# Patient Record
Sex: FEMALE | Race: WHITE | NOT HISPANIC OR LATINO | Employment: FULL TIME | URBAN - METROPOLITAN AREA
[De-identification: names, ages, dates, MRNs, and addresses within clinical notes are randomized per-mention and may not be internally consistent; named-entity substitution may affect disease eponyms.]

---

## 2024-01-26 ENCOUNTER — LAB (OUTPATIENT)
Dept: LAB | Facility: HOSPITAL | Age: 29
End: 2024-01-26
Payer: COMMERCIAL

## 2024-01-26 ENCOUNTER — TRANSCRIBE ORDERS (OUTPATIENT)
Dept: LAB | Facility: HOSPITAL | Age: 29
End: 2024-01-26
Payer: COMMERCIAL

## 2024-01-26 DIAGNOSIS — Z34.81 ENCOUNTER FOR SUPERVISION OF OTHER NORMAL PREGNANCY, FIRST TRIMESTER: Primary | ICD-10-CM

## 2024-01-26 DIAGNOSIS — Z3A.08 8 WEEKS GESTATION OF PREGNANCY: ICD-10-CM

## 2024-01-26 DIAGNOSIS — Z34.81 ENCOUNTER FOR SUPERVISION OF OTHER NORMAL PREGNANCY, FIRST TRIMESTER: ICD-10-CM

## 2024-01-26 LAB
ABO GROUP BLD: NORMAL
AMPHET+METHAMPHET UR QL: NEGATIVE
AMPHETAMINES UR QL: NEGATIVE
BARBITURATES UR QL SCN: NEGATIVE
BENZODIAZ UR QL SCN: NEGATIVE
BLD GP AB SCN SERPL QL: NEGATIVE
BUPRENORPHINE SERPL-MCNC: NEGATIVE NG/ML
CANNABINOIDS SERPL QL: NEGATIVE
COCAINE UR QL: NEGATIVE
DEPRECATED RDW RBC AUTO: 38.5 FL (ref 37–54)
ERYTHROCYTE [DISTWIDTH] IN BLOOD BY AUTOMATED COUNT: 12 % (ref 12.3–15.4)
FENTANYL UR-MCNC: NEGATIVE NG/ML
HBV SURFACE AG SERPL QL IA: NORMAL
HCT VFR BLD AUTO: 37.1 % (ref 34–46.6)
HCV AB SER DONR QL: NORMAL
HGB BLD-MCNC: 12.7 G/DL (ref 12–15.9)
HIV 1+2 AB+HIV1 P24 AG SERPL QL IA: NORMAL
HOLD SPECIMEN: NORMAL
MCH RBC QN AUTO: 30.5 PG (ref 26.6–33)
MCHC RBC AUTO-ENTMCNC: 34.2 G/DL (ref 31.5–35.7)
MCV RBC AUTO: 89 FL (ref 79–97)
METHADONE UR QL SCN: NEGATIVE
OPIATES UR QL: NEGATIVE
OXYCODONE UR QL SCN: NEGATIVE
PCP UR QL SCN: NEGATIVE
PLATELET # BLD AUTO: 223 10*3/MM3 (ref 140–450)
PMV BLD AUTO: 10.4 FL (ref 6–12)
RBC # BLD AUTO: 4.17 10*6/MM3 (ref 3.77–5.28)
RH BLD: POSITIVE
TRICYCLICS UR QL SCN: NEGATIVE
WBC NRBC COR # BLD AUTO: 9.21 10*3/MM3 (ref 3.4–10.8)

## 2024-01-26 PROCEDURE — 86803 HEPATITIS C AB TEST: CPT

## 2024-01-26 PROCEDURE — 86901 BLOOD TYPING SEROLOGIC RH(D): CPT

## 2024-01-26 PROCEDURE — 80307 DRUG TEST PRSMV CHEM ANLYZR: CPT

## 2024-01-26 PROCEDURE — 87491 CHLMYD TRACH DNA AMP PROBE: CPT

## 2024-01-26 PROCEDURE — 87591 N.GONORRHOEAE DNA AMP PROB: CPT

## 2024-01-26 PROCEDURE — 85027 COMPLETE CBC AUTOMATED: CPT

## 2024-01-26 PROCEDURE — 86762 RUBELLA ANTIBODY: CPT

## 2024-01-26 PROCEDURE — 87340 HEPATITIS B SURFACE AG IA: CPT

## 2024-01-26 PROCEDURE — 81001 URINALYSIS AUTO W/SCOPE: CPT

## 2024-01-26 PROCEDURE — 87086 URINE CULTURE/COLONY COUNT: CPT

## 2024-01-26 PROCEDURE — 86900 BLOOD TYPING SEROLOGIC ABO: CPT

## 2024-01-26 PROCEDURE — 86780 TREPONEMA PALLIDUM: CPT

## 2024-01-26 PROCEDURE — G0432 EIA HIV-1/HIV-2 SCREEN: HCPCS

## 2024-01-26 PROCEDURE — 86850 RBC ANTIBODY SCREEN: CPT

## 2024-01-26 PROCEDURE — 36415 COLL VENOUS BLD VENIPUNCTURE: CPT

## 2024-01-27 LAB
BACTERIA SPEC AEROBE CULT: NORMAL
BACTERIA UR QL AUTO: ABNORMAL /HPF
BILIRUB UR QL STRIP: NEGATIVE
CLARITY UR: CLEAR
COLOR UR: YELLOW
GLUCOSE UR STRIP-MCNC: NEGATIVE MG/DL
HGB UR QL STRIP.AUTO: NEGATIVE
HYALINE CASTS UR QL AUTO: ABNORMAL /LPF
KETONES UR QL STRIP: NEGATIVE
LEUKOCYTE ESTERASE UR QL STRIP.AUTO: ABNORMAL
NITRITE UR QL STRIP: NEGATIVE
PH UR STRIP.AUTO: 6.5 [PH] (ref 5–8)
PROT UR QL STRIP: NEGATIVE
RBC # UR STRIP: ABNORMAL /HPF
REF LAB TEST METHOD: ABNORMAL
SP GR UR STRIP: 1.01 (ref 1–1.03)
SQUAMOUS #/AREA URNS HPF: ABNORMAL /HPF
UROBILINOGEN UR QL STRIP: ABNORMAL
WBC # UR STRIP: ABNORMAL /HPF

## 2024-01-28 LAB — RUBV IGG SERPL IA-ACNC: 6.49 INDEX

## 2024-01-30 LAB
C TRACH RRNA SPEC QL NAA+PROBE: NEGATIVE
N GONORRHOEA RRNA SPEC QL NAA+PROBE: NEGATIVE
TREPONEMA PALLIDUM IGG+IGM AB [PRESENCE] IN SERUM OR PLASMA BY IMMUNOASSAY: NON REACTIVE

## 2024-05-21 ENCOUNTER — TRANSCRIBE ORDERS (OUTPATIENT)
Facility: HOSPITAL | Age: 29
End: 2024-05-21
Payer: COMMERCIAL

## 2024-05-21 ENCOUNTER — LAB (OUTPATIENT)
Facility: HOSPITAL | Age: 29
End: 2024-05-21
Payer: COMMERCIAL

## 2024-05-21 DIAGNOSIS — Z3A.24 24 WEEKS GESTATION OF PREGNANCY: Primary | ICD-10-CM

## 2024-05-21 DIAGNOSIS — Z3A.24 24 WEEKS GESTATION OF PREGNANCY: ICD-10-CM

## 2024-05-21 LAB
DEPRECATED RDW RBC AUTO: 42.1 FL (ref 37–54)
ERYTHROCYTE [DISTWIDTH] IN BLOOD BY AUTOMATED COUNT: 12.2 % (ref 12.3–15.4)
GLUCOSE 1H P 100 G GLC PO SERPL-MCNC: 105 MG/DL (ref 65–139)
HCT VFR BLD AUTO: 32 % (ref 34–46.6)
HGB BLD-MCNC: 10.7 G/DL (ref 12–15.9)
MCH RBC QN AUTO: 31.8 PG (ref 26.6–33)
MCHC RBC AUTO-ENTMCNC: 33.4 G/DL (ref 31.5–35.7)
MCV RBC AUTO: 95 FL (ref 79–97)
PLATELET # BLD AUTO: 166 10*3/MM3 (ref 140–450)
PMV BLD AUTO: 10.1 FL (ref 6–12)
RBC # BLD AUTO: 3.37 10*6/MM3 (ref 3.77–5.28)
WBC NRBC COR # BLD AUTO: 9.35 10*3/MM3 (ref 3.4–10.8)

## 2024-05-21 PROCEDURE — 82950 GLUCOSE TEST: CPT

## 2024-05-21 PROCEDURE — 86780 TREPONEMA PALLIDUM: CPT

## 2024-05-21 PROCEDURE — 85027 COMPLETE CBC AUTOMATED: CPT

## 2024-05-21 PROCEDURE — 36415 COLL VENOUS BLD VENIPUNCTURE: CPT

## 2024-05-24 LAB — TREPONEMA PALLIDUM IGG+IGM AB [PRESENCE] IN SERUM OR PLASMA BY IMMUNOASSAY: NON REACTIVE

## 2024-07-11 ENCOUNTER — HOSPITAL ENCOUNTER (INPATIENT)
Facility: HOSPITAL | Age: 29
LOS: 4 days | Discharge: HOME | End: 2024-07-15
Attending: OBSTETRICS & GYNECOLOGY | Admitting: OBSTETRICS & GYNECOLOGY
Payer: COMMERCIAL

## 2024-07-11 ENCOUNTER — PREP FOR SURGERY (OUTPATIENT)
Dept: OTHER | Facility: HOSPITAL | Age: 29
End: 2024-07-11
Payer: COMMERCIAL

## 2024-07-11 DIAGNOSIS — O13.3 PREGNANCY-INDUCED HYPERTENSION, THIRD TRIMESTER: Primary | ICD-10-CM

## 2024-07-11 DIAGNOSIS — O13.3 PREGNANCY-INDUCED HYPERTENSION, THIRD TRIMESTER: ICD-10-CM

## 2024-07-11 PROBLEM — O16.9 HYPERTENSION IN PREGNANCY, ANTEPARTUM: Status: ACTIVE | Noted: 2024-07-11

## 2024-07-11 LAB
ABO GROUP BLD: NORMAL
ALBUMIN SERPL-MCNC: 4 G/DL (ref 3.5–5.2)
ALBUMIN/GLOB SERPL: 1.3 G/DL
ALP SERPL-CCNC: 170 U/L (ref 39–117)
ALT SERPL W P-5'-P-CCNC: 13 U/L (ref 1–33)
ANION GAP SERPL CALCULATED.3IONS-SCNC: 15 MMOL/L (ref 5–15)
AST SERPL-CCNC: 25 U/L (ref 1–32)
BILIRUB SERPL-MCNC: 0.5 MG/DL (ref 0–1.2)
BLD GP AB SCN SERPL QL: NEGATIVE
BUN SERPL-MCNC: 4 MG/DL (ref 6–20)
BUN/CREAT SERPL: 7.4 (ref 7–25)
CALCIUM SPEC-SCNC: 9.5 MG/DL (ref 8.6–10.5)
CHLORIDE SERPL-SCNC: 103 MMOL/L (ref 98–107)
CO2 SERPL-SCNC: 19 MMOL/L (ref 22–29)
CREAT SERPL-MCNC: 0.54 MG/DL (ref 0.57–1)
DEPRECATED RDW RBC AUTO: 40.5 FL (ref 37–54)
EGFRCR SERPLBLD CKD-EPI 2021: 129.6 ML/MIN/1.73
ERYTHROCYTE [DISTWIDTH] IN BLOOD BY AUTOMATED COUNT: 12.4 % (ref 12.3–15.4)
GLOBULIN UR ELPH-MCNC: 3 GM/DL
GLUCOSE SERPL-MCNC: 89 MG/DL (ref 65–99)
HCT VFR BLD AUTO: 36.1 % (ref 34–46.6)
HGB BLD-MCNC: 12.6 G/DL (ref 12–15.9)
LDH SERPL-CCNC: 222 U/L (ref 135–214)
MCH RBC QN AUTO: 31.3 PG (ref 26.6–33)
MCHC RBC AUTO-ENTMCNC: 34.9 G/DL (ref 31.5–35.7)
MCV RBC AUTO: 89.6 FL (ref 79–97)
PLATELET # BLD AUTO: 173 10*3/MM3 (ref 140–450)
PMV BLD AUTO: 10.7 FL (ref 6–12)
POTASSIUM SERPL-SCNC: 3.5 MMOL/L (ref 3.5–5.2)
PROT SERPL-MCNC: 7 G/DL (ref 6–8.5)
RBC # BLD AUTO: 4.03 10*6/MM3 (ref 3.77–5.28)
RH BLD: POSITIVE
SODIUM SERPL-SCNC: 137 MMOL/L (ref 136–145)
T&S EXPIRATION DATE: NORMAL
TREPONEMA PALLIDUM IGG+IGM AB [PRESENCE] IN SERUM OR PLASMA BY IMMUNOASSAY: NORMAL
URATE SERPL-MCNC: 4.7 MG/DL (ref 2.4–5.7)
WBC NRBC COR # BLD AUTO: 13.02 10*3/MM3 (ref 3.4–10.8)

## 2024-07-11 PROCEDURE — 84156 ASSAY OF PROTEIN URINE: CPT | Performed by: OBSTETRICS & GYNECOLOGY

## 2024-07-11 PROCEDURE — 84550 ASSAY OF BLOOD/URIC ACID: CPT | Performed by: OBSTETRICS & GYNECOLOGY

## 2024-07-11 PROCEDURE — 86850 RBC ANTIBODY SCREEN: CPT | Performed by: OBSTETRICS & GYNECOLOGY

## 2024-07-11 PROCEDURE — 83615 LACTATE (LD) (LDH) ENZYME: CPT | Performed by: OBSTETRICS & GYNECOLOGY

## 2024-07-11 PROCEDURE — 86780 TREPONEMA PALLIDUM: CPT | Performed by: OBSTETRICS & GYNECOLOGY

## 2024-07-11 PROCEDURE — 25010000002 PENICILLIN G POTASSIUM PER 600000 UNITS: Performed by: OBSTETRICS & GYNECOLOGY

## 2024-07-11 PROCEDURE — 85027 COMPLETE CBC AUTOMATED: CPT | Performed by: OBSTETRICS & GYNECOLOGY

## 2024-07-11 PROCEDURE — 86901 BLOOD TYPING SEROLOGIC RH(D): CPT | Performed by: OBSTETRICS & GYNECOLOGY

## 2024-07-11 PROCEDURE — 82570 ASSAY OF URINE CREATININE: CPT | Performed by: OBSTETRICS & GYNECOLOGY

## 2024-07-11 PROCEDURE — 80053 COMPREHEN METABOLIC PANEL: CPT | Performed by: OBSTETRICS & GYNECOLOGY

## 2024-07-11 PROCEDURE — 25010000002 BETAMETHASONE ACET & SOD PHOS PER 4 MG: Performed by: OBSTETRICS & GYNECOLOGY

## 2024-07-11 PROCEDURE — 86900 BLOOD TYPING SEROLOGIC ABO: CPT | Performed by: OBSTETRICS & GYNECOLOGY

## 2024-07-11 RX ORDER — ACETAMINOPHEN 325 MG/1
650 TABLET ORAL EVERY 4 HOURS PRN
Status: CANCELLED | OUTPATIENT
Start: 2024-07-11

## 2024-07-11 RX ORDER — PENICILLIN G 3000000 [IU]/50ML
3 INJECTION, SOLUTION INTRAVENOUS EVERY 4 HOURS
Status: CANCELLED | OUTPATIENT
Start: 2024-07-11 | End: 2024-07-13

## 2024-07-11 RX ORDER — SODIUM CHLORIDE 0.9 % (FLUSH) 0.9 %
10 SYRINGE (ML) INJECTION EVERY 12 HOURS SCHEDULED
Status: CANCELLED | OUTPATIENT
Start: 2024-07-11

## 2024-07-11 RX ORDER — HYDRALAZINE HYDROCHLORIDE 20 MG/ML
5-10 INJECTION INTRAMUSCULAR; INTRAVENOUS
Status: DISCONTINUED | OUTPATIENT
Start: 2024-07-11 | End: 2024-07-15 | Stop reason: HOSPADM

## 2024-07-11 RX ORDER — SODIUM CHLORIDE 0.9 % (FLUSH) 0.9 %
10 SYRINGE (ML) INJECTION AS NEEDED
Status: CANCELLED | OUTPATIENT
Start: 2024-07-11

## 2024-07-11 RX ORDER — BETAMETHASONE SODIUM PHOSPHATE AND BETAMETHASONE ACETATE 3; 3 MG/ML; MG/ML
12 INJECTION, SUSPENSION INTRA-ARTICULAR; INTRALESIONAL; INTRAMUSCULAR; SOFT TISSUE ONCE
Qty: 2 ML | Refills: 0 | Status: COMPLETED | OUTPATIENT
Start: 2024-07-12 | End: 2024-07-12

## 2024-07-11 RX ORDER — NIFEDIPINE 10 MG/1
10 CAPSULE ORAL ONCE
Status: COMPLETED | OUTPATIENT
Start: 2024-07-11 | End: 2024-07-11

## 2024-07-11 RX ORDER — ACETAMINOPHEN 325 MG/1
650 TABLET ORAL EVERY 4 HOURS PRN
Status: DISCONTINUED | OUTPATIENT
Start: 2024-07-11 | End: 2024-07-15 | Stop reason: HOSPADM

## 2024-07-11 RX ORDER — SODIUM CHLORIDE 9 MG/ML
40 INJECTION, SOLUTION INTRAVENOUS AS NEEDED
Status: DISCONTINUED | OUTPATIENT
Start: 2024-07-11 | End: 2024-07-15 | Stop reason: HOSPADM

## 2024-07-11 RX ORDER — LIDOCAINE HYDROCHLORIDE 10 MG/ML
0.5 INJECTION, SOLUTION EPIDURAL; INFILTRATION; INTRACAUDAL; PERINEURAL ONCE AS NEEDED
Status: DISCONTINUED | OUTPATIENT
Start: 2024-07-11 | End: 2024-07-15 | Stop reason: HOSPADM

## 2024-07-11 RX ORDER — OXYTOCIN/0.9 % SODIUM CHLORIDE 30/500 ML
2-30 PLASTIC BAG, INJECTION (ML) INTRAVENOUS
Status: DISCONTINUED | OUTPATIENT
Start: 2024-07-12 | End: 2024-07-13

## 2024-07-11 RX ORDER — ONDANSETRON 4 MG/1
8 TABLET, ORALLY DISINTEGRATING ORAL EVERY 8 HOURS PRN
Status: CANCELLED | OUTPATIENT
Start: 2024-07-11

## 2024-07-11 RX ORDER — ONDANSETRON 2 MG/ML
4 INJECTION INTRAMUSCULAR; INTRAVENOUS EVERY 8 HOURS PRN
Status: DISCONTINUED | OUTPATIENT
Start: 2024-07-11 | End: 2024-07-15 | Stop reason: HOSPADM

## 2024-07-11 RX ORDER — ONDANSETRON 2 MG/ML
4 INJECTION INTRAMUSCULAR; INTRAVENOUS EVERY 8 HOURS PRN
Status: CANCELLED | OUTPATIENT
Start: 2024-07-11

## 2024-07-11 RX ORDER — SODIUM CHLORIDE 0.9 % (FLUSH) 0.9 %
10 SYRINGE (ML) INJECTION AS NEEDED
Status: DISCONTINUED | OUTPATIENT
Start: 2024-07-11 | End: 2024-07-15 | Stop reason: HOSPADM

## 2024-07-11 RX ORDER — LABETALOL HYDROCHLORIDE 5 MG/ML
20-80 INJECTION, SOLUTION INTRAVENOUS
Status: DISCONTINUED | OUTPATIENT
Start: 2024-07-11 | End: 2024-07-15 | Stop reason: HOSPADM

## 2024-07-11 RX ORDER — DEXTROSE AND SODIUM CHLORIDE 5; .2 G/100ML; G/100ML
125 INJECTION, SOLUTION INTRAVENOUS CONTINUOUS
Status: CANCELLED | OUTPATIENT
Start: 2024-07-11

## 2024-07-11 RX ORDER — PRENATAL WITH FERROUS FUM AND FOLIC ACID 3080; 920; 120; 400; 22; 1.84; 3; 20; 10; 1; 12; 200; 27; 25; 2 [IU]/1; [IU]/1; MG/1; [IU]/1; MG/1; MG/1; MG/1; MG/1; MG/1; MG/1; UG/1; MG/1; MG/1; MG/1; MG/1
TABLET ORAL DAILY
COMMUNITY

## 2024-07-11 RX ORDER — NIFEDIPINE 10 MG/1
10-20 CAPSULE ORAL
Status: DISCONTINUED | OUTPATIENT
Start: 2024-07-11 | End: 2024-07-15 | Stop reason: HOSPADM

## 2024-07-11 RX ORDER — LIDOCAINE HYDROCHLORIDE 10 MG/ML
0.5 INJECTION, SOLUTION EPIDURAL; INFILTRATION; INTRACAUDAL; PERINEURAL ONCE AS NEEDED
Status: CANCELLED | OUTPATIENT
Start: 2024-07-11

## 2024-07-11 RX ORDER — SODIUM CHLORIDE 9 MG/ML
40 INJECTION, SOLUTION INTRAVENOUS AS NEEDED
Status: CANCELLED | OUTPATIENT
Start: 2024-07-11

## 2024-07-11 RX ORDER — FERROUS SULFATE 324(65)MG
324 TABLET, DELAYED RELEASE (ENTERIC COATED) ORAL
COMMUNITY

## 2024-07-11 RX ORDER — SODIUM CHLORIDE 0.9 % (FLUSH) 0.9 %
10 SYRINGE (ML) INJECTION EVERY 12 HOURS SCHEDULED
Status: DISCONTINUED | OUTPATIENT
Start: 2024-07-11 | End: 2024-07-15 | Stop reason: HOSPADM

## 2024-07-11 RX ORDER — BETAMETHASONE SODIUM PHOSPHATE AND BETAMETHASONE ACETATE 3; 3 MG/ML; MG/ML
12 INJECTION, SUSPENSION INTRA-ARTICULAR; INTRALESIONAL; INTRAMUSCULAR; SOFT TISSUE EVERY 24 HOURS
Status: CANCELLED | OUTPATIENT
Start: 2024-07-11 | End: 2024-07-13

## 2024-07-11 RX ORDER — ONDANSETRON 4 MG/1
8 TABLET, ORALLY DISINTEGRATING ORAL EVERY 8 HOURS PRN
Status: DISCONTINUED | OUTPATIENT
Start: 2024-07-11 | End: 2024-07-15 | Stop reason: HOSPADM

## 2024-07-11 RX ORDER — DEXTROSE AND SODIUM CHLORIDE 5; .2 G/100ML; G/100ML
125 INJECTION, SOLUTION INTRAVENOUS CONTINUOUS
Status: DISCONTINUED | OUTPATIENT
Start: 2024-07-11 | End: 2024-07-13

## 2024-07-11 RX ORDER — BETAMETHASONE SODIUM PHOSPHATE AND BETAMETHASONE ACETATE 3; 3 MG/ML; MG/ML
12 INJECTION, SUSPENSION INTRA-ARTICULAR; INTRALESIONAL; INTRAMUSCULAR; SOFT TISSUE EVERY 24 HOURS
Qty: 4 ML | Refills: 0 | Status: DISCONTINUED | OUTPATIENT
Start: 2024-07-11 | End: 2024-07-11

## 2024-07-11 RX ORDER — PENICILLIN G 3000000 [IU]/50ML
3 INJECTION, SOLUTION INTRAVENOUS EVERY 4 HOURS
Status: DISPENSED | OUTPATIENT
Start: 2024-07-11 | End: 2024-07-13

## 2024-07-11 RX ORDER — MAGNESIUM SULFATE HEPTAHYDRATE 40 MG/ML
2 INJECTION, SOLUTION INTRAVENOUS CONTINUOUS
Status: DISCONTINUED | OUTPATIENT
Start: 2024-07-11 | End: 2024-07-13 | Stop reason: SDUPTHER

## 2024-07-11 RX ORDER — MAGNESIUM SULFATE HEPTAHYDRATE 40 MG/ML
2 INJECTION, SOLUTION INTRAVENOUS CONTINUOUS
Status: CANCELLED | OUTPATIENT
Start: 2024-07-11 | End: 2024-07-14

## 2024-07-11 RX ADMIN — PENICILLIN G 3 MILLION UNITS: 3000000 INJECTION, SOLUTION INTRAVENOUS at 22:29

## 2024-07-11 RX ADMIN — SODIUM CHLORIDE 5 MILLION UNITS: 900 INJECTION INTRAVENOUS at 19:25

## 2024-07-11 RX ADMIN — DEXTROSE AND SODIUM CHLORIDE 125 ML/HR: 5; 200 INJECTION, SOLUTION INTRAVENOUS at 18:29

## 2024-07-11 RX ADMIN — BETAMETHASONE ACETATE AND BETAMETHASONE SODIUM PHOSPHATE 12 MG: 3; 3 INJECTION, SUSPENSION INTRA-ARTICULAR; INTRALESIONAL; INTRAMUSCULAR; SOFT TISSUE at 18:27

## 2024-07-11 RX ADMIN — NIFEDIPINE 10 MG: 10 CAPSULE ORAL at 18:16

## 2024-07-11 NOTE — H&P
[facility]  Obstetric History and Physical    Chief Complaint   Patient presents with    Elevated Blood Pressure       Subjective     Patient is a 29 y.o. female  currently at 33w6d, who presented to the office for routine prenatal visit.  Her blood pressure was noted to be severe range.  She reported a headache 3 days ago that resolved with Tylenol.  Her ears have felt very hot for the last 3 days.    Her prenatal care had previously been uncomplicated to date.  Her previous obstetric/gynecological history is non-contributory.    The following portions of the patients history were reviewed and updated as appropriate: current medications, allergies, past medical history, past surgical history, past family history, past social history, and problem list .                   Past OB History:       OB History    Para Term  AB Living   1 0 0 0 0 0   SAB IAB Ectopic Molar Multiple Live Births   0 0 0 0 0 0      # Outcome Date GA Lbr Juan/2nd Weight Sex Type Anes PTL Lv   1 Current                Past Medical History: No past medical history on file.   Past Surgical History Past Surgical History:   Procedure Laterality Date    WISDOM TOOTH EXTRACTION        Family History: No family history on file.   Social History:  reports that she has never smoked. She has never used smokeless tobacco.   reports no history of alcohol use.   reports no history of drug use.        REVIEW OF SYSTEMS              Reports fetal movement is normal             Denies leakage of amniotic fluid.             Denies vaginal bleeding             She reports No contractions             All other systems reviewed and are negative    Objective       Vital Signs Range for the last 24 hours  Temperature:     Temp Source:     BP: BP: (162-166)/(105-106) 166/106   Pulse:     Respirations:     SPO2:     O2 Amount (l/min):     O2 Devices     Weight: Weight:  [68.9 kg (152 lb)] 68.9 kg (152 lb)          Fetal Heart Rate Assessment    Method:     Beats/min:     Baseline:  135   Variability:  moderate   Accels:  15x15   Decels:  absent   Tracing Category:  1     Uterine Assessment   Method: Method: palpation, per patient report   Frequency (min):  q4min   Ctx Count in 10 min:     Duration:     Intensity: Contraction Intensity: no contractions   Intensity by IUPC:     Resting Tone: Uterine Resting Tone: soft by palpation   Resting Tone by IUPC:     [place] Units:       Constitutional:  Well developed, well nourished, no acute distress, well-groomed.   Uterus: Soft, nontender. Fundus appropriate for dates.  Neurologic:  Alert & oriented x 3,  no focal deficits noted.   Psychiatric:  Speech and behavior appropriate.   Extremities: no cyanosis, clubbing or edema, no evidence of DVT.        Labs:  Lab Results   Component Value Date    WBC 9.35 05/21/2024    HGB 10.7 (L) 05/21/2024    HCT 32.0 (L) 05/21/2024    MCV 95.0 05/21/2024     05/21/2024    CREATININE 0.73 04/09/2021    AST 23 04/09/2021    ALT 17 04/09/2021               Assessment & Plan       Hypertension in pregnancy, antepartum        Assessment:  1.  Intrauterine pregnancy at 33w6d weeks gestation with reactive fetal status.    2.   Severe hypertension  3.  Obstetrical history non-contributory  4.  GBS status: unknown    Plan:  CBC, PEP, urine P:C.  Nifedipine IR now for severe range blood pressure.  Betamethasone for fetal lung maturity.  Repeat in 12 hours.  Magnesium sulfate for seizure prophylaxis.  2. Plan of care has been reviewed with patient and questions answered.  3.  Risks, benefits of treatment plan have been discussed.  4.  All questions have been answered.        Nelly Wesley MD  7/11/2024  18:20 EDT

## 2024-07-12 ENCOUNTER — ANESTHESIA EVENT (OUTPATIENT)
Dept: LABOR AND DELIVERY | Facility: HOSPITAL | Age: 29
End: 2024-07-12
Payer: COMMERCIAL

## 2024-07-12 ENCOUNTER — ANESTHESIA (OUTPATIENT)
Dept: LABOR AND DELIVERY | Facility: HOSPITAL | Age: 29
End: 2024-07-12
Payer: COMMERCIAL

## 2024-07-12 LAB
CREAT UR-MCNC: 13.7 MG/DL
PROT ?TM UR-MCNC: 5.4 MG/DL
PROT/CREAT UR: 394.2 MG/G CREA (ref 0–200)

## 2024-07-12 PROCEDURE — 25010000002 ONDANSETRON PER 1 MG: Performed by: OBSTETRICS & GYNECOLOGY

## 2024-07-12 PROCEDURE — 25010000002 MAGNESIUM SULFATE 20 GM/500ML SOLUTION: Performed by: OBSTETRICS & GYNECOLOGY

## 2024-07-12 PROCEDURE — 25010000002 BETAMETHASONE ACET & SOD PHOS PER 4 MG: Performed by: OBSTETRICS & GYNECOLOGY

## 2024-07-12 PROCEDURE — 25810000003 LACTATED RINGERS SOLUTION: Performed by: ANESTHESIOLOGY

## 2024-07-12 PROCEDURE — 25010000002 FENTANYL CITRATE (PF) 50 MCG/ML SOLUTION: Performed by: ANESTHESIOLOGY

## 2024-07-12 PROCEDURE — 0 LABETALOL 5 MG/ML SOLUTION: Performed by: OBSTETRICS & GYNECOLOGY

## 2024-07-12 PROCEDURE — 25010000002 PENICILLIN G POTASSIUM PER 600000 UNITS: Performed by: OBSTETRICS & GYNECOLOGY

## 2024-07-12 PROCEDURE — 25010000002 BUPIVACAINE (PF) 0.25 % SOLUTION: Performed by: ANESTHESIOLOGY

## 2024-07-12 PROCEDURE — 25010000002 ROPIVACAINE PER 1 MG: Performed by: ANESTHESIOLOGY

## 2024-07-12 PROCEDURE — 25810000003 LACTATED RINGERS SOLUTION: Performed by: STUDENT IN AN ORGANIZED HEALTH CARE EDUCATION/TRAINING PROGRAM

## 2024-07-12 PROCEDURE — C1755 CATHETER, INTRASPINAL: HCPCS | Performed by: ANESTHESIOLOGY

## 2024-07-12 PROCEDURE — 59025 FETAL NON-STRESS TEST: CPT

## 2024-07-12 PROCEDURE — 59200 INSERT CERVICAL DILATOR: CPT | Performed by: OBSTETRICS & GYNECOLOGY

## 2024-07-12 RX ORDER — CITRIC ACID/SODIUM CITRATE 334-500MG
30 SOLUTION, ORAL ORAL ONCE
Status: DISCONTINUED | OUTPATIENT
Start: 2024-07-12 | End: 2024-07-15 | Stop reason: HOSPADM

## 2024-07-12 RX ORDER — DIPHENHYDRAMINE HYDROCHLORIDE 50 MG/ML
12.5 INJECTION INTRAMUSCULAR; INTRAVENOUS EVERY 8 HOURS PRN
Status: DISCONTINUED | OUTPATIENT
Start: 2024-07-12 | End: 2024-07-15 | Stop reason: HOSPADM

## 2024-07-12 RX ORDER — FENTANYL CITRATE 50 UG/ML
INJECTION, SOLUTION INTRAMUSCULAR; INTRAVENOUS AS NEEDED
Status: DISCONTINUED | OUTPATIENT
Start: 2024-07-12 | End: 2024-07-13 | Stop reason: SURG

## 2024-07-12 RX ORDER — SODIUM CHLORIDE, SODIUM LACTATE, POTASSIUM CHLORIDE, CALCIUM CHLORIDE 600; 310; 30; 20 MG/100ML; MG/100ML; MG/100ML; MG/100ML
100 INJECTION, SOLUTION INTRAVENOUS CONTINUOUS
Status: DISCONTINUED | OUTPATIENT
Start: 2024-07-13 | End: 2024-07-15 | Stop reason: HOSPADM

## 2024-07-12 RX ORDER — METOCLOPRAMIDE HYDROCHLORIDE 5 MG/ML
10 INJECTION INTRAMUSCULAR; INTRAVENOUS ONCE AS NEEDED
Status: DISCONTINUED | OUTPATIENT
Start: 2024-07-12 | End: 2024-07-15 | Stop reason: HOSPADM

## 2024-07-12 RX ORDER — FAMOTIDINE 10 MG/ML
20 INJECTION, SOLUTION INTRAVENOUS ONCE AS NEEDED
Status: DISCONTINUED | OUTPATIENT
Start: 2024-07-12 | End: 2024-07-15 | Stop reason: HOSPADM

## 2024-07-12 RX ORDER — ROPIVACAINE HYDROCHLORIDE 2 MG/ML
15 INJECTION, SOLUTION EPIDURAL; INFILTRATION; PERINEURAL CONTINUOUS
Status: DISCONTINUED | OUTPATIENT
Start: 2024-07-12 | End: 2024-07-13

## 2024-07-12 RX ORDER — LIDOCAINE HYDROCHLORIDE AND EPINEPHRINE 15; 5 MG/ML; UG/ML
INJECTION, SOLUTION EPIDURAL AS NEEDED
Status: DISCONTINUED | OUTPATIENT
Start: 2024-07-12 | End: 2024-07-13 | Stop reason: SURG

## 2024-07-12 RX ORDER — EPHEDRINE SULFATE 5 MG/ML
10 INJECTION INTRAVENOUS
Status: DISCONTINUED | OUTPATIENT
Start: 2024-07-12 | End: 2024-07-13

## 2024-07-12 RX ORDER — CALCIUM CARBONATE 500 MG/1
2 TABLET, CHEWABLE ORAL ONCE
Status: COMPLETED | OUTPATIENT
Start: 2024-07-13 | End: 2024-07-12

## 2024-07-12 RX ORDER — BUPIVACAINE HYDROCHLORIDE 2.5 MG/ML
INJECTION, SOLUTION EPIDURAL; INFILTRATION; INTRACAUDAL AS NEEDED
Status: DISCONTINUED | OUTPATIENT
Start: 2024-07-12 | End: 2024-07-13 | Stop reason: SURG

## 2024-07-12 RX ORDER — ONDANSETRON 2 MG/ML
4 INJECTION INTRAMUSCULAR; INTRAVENOUS ONCE AS NEEDED
Status: DISCONTINUED | OUTPATIENT
Start: 2024-07-12 | End: 2024-07-15 | Stop reason: HOSPADM

## 2024-07-12 RX ADMIN — FENTANYL CITRATE 100 MCG: 50 INJECTION, SOLUTION INTRAMUSCULAR; INTRAVENOUS at 20:55

## 2024-07-12 RX ADMIN — PENICILLIN G 3 MILLION UNITS: 3000000 INJECTION, SOLUTION INTRAVENOUS at 14:45

## 2024-07-12 RX ADMIN — MAGNESIUM SULFATE HEPTAHYDRATE 2 G/HR: 40 INJECTION, SOLUTION INTRAVENOUS at 14:45

## 2024-07-12 RX ADMIN — PENICILLIN G 3 MILLION UNITS: 3000000 INJECTION, SOLUTION INTRAVENOUS at 02:41

## 2024-07-12 RX ADMIN — LIDOCAINE HYDROCHLORIDE AND EPINEPHRINE 2 ML: 15; 5 INJECTION, SOLUTION EPIDURAL at 20:54

## 2024-07-12 RX ADMIN — SODIUM CHLORIDE, POTASSIUM CHLORIDE, SODIUM LACTATE AND CALCIUM CHLORIDE 1000 ML: 600; 310; 30; 20 INJECTION, SOLUTION INTRAVENOUS at 20:20

## 2024-07-12 RX ADMIN — BETAMETHASONE ACETATE AND BETAMETHASONE SODIUM PHOSPHATE 12 MG: 3; 3 INJECTION, SUSPENSION INTRA-ARTICULAR; INTRALESIONAL; INTRAMUSCULAR; SOFT TISSUE at 06:30

## 2024-07-12 RX ADMIN — DEXTROSE AND SODIUM CHLORIDE 125 ML/HR: 5; 200 INJECTION, SOLUTION INTRAVENOUS at 21:12

## 2024-07-12 RX ADMIN — Medication 20 MG: at 05:40

## 2024-07-12 RX ADMIN — BUPIVACAINE HYDROCHLORIDE 10 ML: 2.5 INJECTION, SOLUTION EPIDURAL; INFILTRATION; INTRACAUDAL; PERINEURAL at 20:56

## 2024-07-12 RX ADMIN — SODIUM CHLORIDE, POTASSIUM CHLORIDE, SODIUM LACTATE AND CALCIUM CHLORIDE 500 ML: 600; 310; 30; 20 INJECTION, SOLUTION INTRAVENOUS at 23:52

## 2024-07-12 RX ADMIN — CALCIUM CARBONATE (ANTACID) CHEW TAB 500 MG 2 TABLET: 500 CHEW TAB at 23:10

## 2024-07-12 RX ADMIN — ROPIVACAINE HYDROCHLORIDE 12 ML/HR: 2 INJECTION, SOLUTION EPIDURAL; INFILTRATION at 21:03

## 2024-07-12 RX ADMIN — ACETAMINOPHEN 650 MG: 325 TABLET ORAL at 10:43

## 2024-07-12 RX ADMIN — PENICILLIN G 3 MILLION UNITS: 3000000 INJECTION, SOLUTION INTRAVENOUS at 06:30

## 2024-07-12 RX ADMIN — ACETAMINOPHEN 650 MG: 325 TABLET ORAL at 14:49

## 2024-07-12 RX ADMIN — ONDANSETRON 4 MG: 2 INJECTION INTRAMUSCULAR; INTRAVENOUS at 05:30

## 2024-07-12 RX ADMIN — MAGNESIUM SULFATE HEPTAHYDRATE 2 G/HR: 40 INJECTION, SOLUTION INTRAVENOUS at 03:22

## 2024-07-12 RX ADMIN — PENICILLIN G 3 MILLION UNITS: 3000000 INJECTION, SOLUTION INTRAVENOUS at 23:10

## 2024-07-12 RX ADMIN — PENICILLIN G 3 MILLION UNITS: 3000000 INJECTION, SOLUTION INTRAVENOUS at 18:37

## 2024-07-12 RX ADMIN — Medication 2 MILLI-UNITS/MIN: at 05:20

## 2024-07-12 RX ADMIN — PENICILLIN G 3 MILLION UNITS: 3000000 INJECTION, SOLUTION INTRAVENOUS at 10:43

## 2024-07-12 RX ADMIN — LIDOCAINE HYDROCHLORIDE AND EPINEPHRINE 3 ML: 15; 5 INJECTION, SOLUTION EPIDURAL at 20:53

## 2024-07-12 NOTE — NURSING NOTE
PRENATAL CONTACT - NDRT    Requested by OB to meet with parents to update them with delivery and admission procedures for their infant.    Present: MIOL    Pertinent Prenatal Information:    Gestational Age: 34 0/7 weeks  US report: N/A  Other:       The following issues were discussed:    1) Admission Procedure.  2) NICU Visitation Policy.  3) Sibling Visitation Policy  4) Skin to Skin Care ([facility]-Care).  5) Hand Expression for Breast Milk soon after birth.  6) Breast Feeding/Expressing Breast Milk.  7) Tour of NICU offered.  8) Other Issues Discussed: Respiratory support, admission, IV therapy      Concerns Voiced by Parents: all questions answered        Abby Ramesh RN    7/11/2024   22:21 EDT

## 2024-07-12 NOTE — PROGRESS NOTES
[facility]     Labor Progress Note    CC: Labor    IUP 34w0d, comfortable    Vitals:    07/12/24 1139   BP: 142/71   Pulse: 91   Resp: 16   Temp:    SpO2: 100%       Fetal Heart Rate Assessment           Baseline: Fetal HR Baseline: normal range   Varibility: Fetal HR Variability: moderate (amplitude range 6 to 25 bpm)   Accels: Fetal HR Accelerations: greater than/equal to 15 bpm, lasting at least 15 seconds   Decels: Fetal HR Decelerations: absent   Tracing Category:       Uterine Assessment   Method: Method: external tocotransducer   Frequency (min): Contraction Frequency (Minutes): x1 traced   Ctx Count in 10 min: Contractions in 10 Minutes: x2   Intensity by IUPC:     Resting Tone by IUPC:       Cervix: Exam by: Method: sterile exam per physician   Dilation: Cervical Dilation (cm): 4   Effacement: Cervical Effacement: 60%   Station: Fetal Station: -2            Assessment: IUP 34w0d     Plan:   -s/p isabel bulb removal  -SVE 4/60/-2  -Discussed AROM including R/B/I/A  -Patient consented  -AROM performed with return of clear fluid  -Fetal and maternal tolerance of procedure  -Continue IV PPP  -BP well controlled    Mirhta Batres DO  11:53 EDT  07/12/24

## 2024-07-12 NOTE — PROCEDURES
Transcervical isabel placed per physician request.  FHT's class 1.  Patient tolerated well. 24 Tongan, 60ml.    Guille Pal MD  7/12/2024  00:03 EDT

## 2024-07-12 NOTE — PROGRESS NOTES
"07/12/24  19:33 EDT  Kristen Herzog    SUBJECTIVE: comfortable. Denies HA, VC, CP, SOA, RUQ pain.     ASSESSMENTS:     /80 (BP Location: Right arm, Patient Position: Lying)   Pulse 106   Temp 97.9 °F (36.6 °C) (Oral)   Resp 16   Ht 160 cm (63\")   Wt 68.9 kg (152 lb)   SpO2 100%   Breastfeeding No   BMI 26.93 kg/m²     Fetal Heart Rate Assessment   Method: Fetal HR Assessment Method: external   Beats/min: Fetal HR (beats/min): 115   Baseline: Fetal HR Baseline: normal range   Varibility: Fetal HR Variability: moderate (amplitude range 6 to 25 bpm)   Accels: Fetal HR Accelerations: greater than/equal to 15 bpm, lasting at least 15 seconds   Decels: Fetal HR Decelerations: absent   Tracing Category:       Uterine Assessment   Method: Method: IUPC (intrauterine pressure catheter)   Frequency (min): Contraction Frequency (Minutes): 4   Ctx Count in 10 min: Contractions in 10 Minutes: x2   Duration:     Intensity: Contraction Intensity: moderate by palpation   Intensity by IUPC: Contraction Intensity (mmHg) by IUPC: 100   Resting Tone: Uterine Resting Tone: soft by palpation   Resting Tone by IUPC: Uterine Resting Tone (mmHg) by IUPC: 20     Presentation: cephalic   Cervix: Exam by: Method: sterile exam per physician   Dilation: Cervical Dilation (cm): 6   Effacement: Cervical Effacement: 80-90%   Station: Fetal Station: -1            IUP at 34w0d   PreE with SF      PLAN:  -SVE 6/85/-1  -Difficulty monitoring maternal contractions and minimal discomfort. Discussed placement of IUPC for more precise evaluation of uterine contractions. Reviewed R/B/I/A. Patient consented. IUPC plcaed without complications. Fetal and maternal tolerance of procedure  -Orders given for -250mmHg if fetal tolerance allows  -Continue IV Mag for seizure ppx  -IV PCN for infection ppx  -Continue progress toward vaginal delivery    Mirtha Batres DO  19:33 EDT  07/12/24      "

## 2024-07-13 LAB
ATMOSPHERIC PRESS: ABNORMAL MM[HG]
ATMOSPHERIC PRESS: ABNORMAL MM[HG]
BASE EXCESS BLDCOA CALC-SCNC: -4.9 MMOL/L (ref 0–2)
BASE EXCESS BLDCOV CALC-SCNC: -4.8 MMOL/L (ref 0–2)
BDY SITE: ABNORMAL
BDY SITE: ABNORMAL
BODY TEMPERATURE: 37
BODY TEMPERATURE: 37
CO2 BLDA-SCNC: 22.2 MMOL/L (ref 22–33)
CO2 BLDA-SCNC: 24.5 MMOL/L (ref 22–33)
EPAP: 0
EPAP: 0
HCO3 BLDCOA-SCNC: 22.9 MMOL/L (ref 16.9–20.5)
HCO3 BLDCOV-SCNC: 21 MMOL/L (ref 18.6–21.4)
HGB BLDA-MCNC: 15.9 G/DL (ref 14–18)
HGB BLDA-MCNC: 16.3 G/DL (ref 14–18)
INHALED O2 CONCENTRATION: 21 %
INHALED O2 CONCENTRATION: 21 %
IPAP: 0
IPAP: 0
MODALITY: ABNORMAL
MODALITY: ABNORMAL
NOTE: 0
PAW @ PEAK INSP FLOW SETTING VENT: 0 CMH2O
PAW @ PEAK INSP FLOW SETTING VENT: 0 CMH2O
PCO2 BLDCOA: 51.7 MMHG (ref 43.3–54.9)
PCO2 BLDCOV: 40.5 MM HG (ref 28–40)
PH BLDCOA: 7.26 PH UNITS (ref 7.22–7.3)
PH BLDCOV: 7.32 PH UNITS (ref 7.31–7.37)
PO2 BLDCOA: 26.4 MMHG (ref 11.5–43.3)
PO2 BLDCOV: 32.1 MM HG (ref 21–31)
SAO2 % BLDCOA: 56.8 %
SAO2 % BLDCOA: ABNORMAL %
SAO2 % BLDCOV: 74.9 %
TOTAL RATE: 0 BREATHS/MINUTE
TOTAL RATE: 0 BREATHS/MINUTE
VENTILATOR MODE: ABNORMAL
VENTILATOR MODE: ABNORMAL

## 2024-07-13 PROCEDURE — 25010000002 MAGNESIUM SULFATE 20 GM/500ML SOLUTION: Performed by: STUDENT IN AN ORGANIZED HEALTH CARE EDUCATION/TRAINING PROGRAM

## 2024-07-13 PROCEDURE — 25010000002 MAGNESIUM SULFATE 20 GM/500ML SOLUTION: Performed by: OBSTETRICS & GYNECOLOGY

## 2024-07-13 PROCEDURE — 82805 BLOOD GASES W/O2 SATURATION: CPT

## 2024-07-13 PROCEDURE — 25010000002 PENICILLIN G POTASSIUM PER 600000 UNITS: Performed by: OBSTETRICS & GYNECOLOGY

## 2024-07-13 PROCEDURE — 59025 FETAL NON-STRESS TEST: CPT

## 2024-07-13 PROCEDURE — 88307 TISSUE EXAM BY PATHOLOGIST: CPT | Performed by: STUDENT IN AN ORGANIZED HEALTH CARE EDUCATION/TRAINING PROGRAM

## 2024-07-13 PROCEDURE — 25810000003 LACTATED RINGERS PER 1000 ML: Performed by: STUDENT IN AN ORGANIZED HEALTH CARE EDUCATION/TRAINING PROGRAM

## 2024-07-13 PROCEDURE — 10907ZC DRAINAGE OF AMNIOTIC FLUID, THERAPEUTIC FROM PRODUCTS OF CONCEPTION, VIA NATURAL OR ARTIFICIAL OPENING: ICD-10-PCS | Performed by: STUDENT IN AN ORGANIZED HEALTH CARE EDUCATION/TRAINING PROGRAM

## 2024-07-13 RX ORDER — MISOPROSTOL 200 UG/1
800 TABLET ORAL AS NEEDED
Status: CANCELLED | OUTPATIENT
Start: 2024-07-13

## 2024-07-13 RX ORDER — ACETAMINOPHEN 325 MG/1
650 TABLET ORAL EVERY 6 HOURS PRN
Status: CANCELLED | OUTPATIENT
Start: 2024-07-13

## 2024-07-13 RX ORDER — MAGNESIUM SULFATE HEPTAHYDRATE 40 MG/ML
2 INJECTION, SOLUTION INTRAVENOUS CONTINUOUS
Status: CANCELLED | OUTPATIENT
Start: 2024-07-13 | End: 2024-07-14

## 2024-07-13 RX ORDER — METHYLERGONOVINE MALEATE 0.2 MG/ML
200 INJECTION INTRAVENOUS ONCE AS NEEDED
Status: CANCELLED | OUTPATIENT
Start: 2024-07-13

## 2024-07-13 RX ORDER — DEXTROSE AND SODIUM CHLORIDE 5; .2 G/100ML; G/100ML
75 INJECTION, SOLUTION INTRAVENOUS CONTINUOUS
Status: ACTIVE | OUTPATIENT
Start: 2024-07-13 | End: 2024-07-13

## 2024-07-13 RX ORDER — SODIUM CHLORIDE 0.9 % (FLUSH) 0.9 %
1-10 SYRINGE (ML) INJECTION AS NEEDED
Status: CANCELLED | OUTPATIENT
Start: 2024-07-13

## 2024-07-13 RX ORDER — LIDOCAINE HCL/EPINEPHRINE/PF 2%-1:200K
VIAL (ML) INJECTION AS NEEDED
Status: DISCONTINUED | OUTPATIENT
Start: 2024-07-13 | End: 2024-07-13 | Stop reason: SURG

## 2024-07-13 RX ORDER — IBUPROFEN 600 MG/1
600 TABLET ORAL EVERY 6 HOURS PRN
Status: CANCELLED | OUTPATIENT
Start: 2024-07-13

## 2024-07-13 RX ORDER — DOCUSATE SODIUM 100 MG/1
100 CAPSULE, LIQUID FILLED ORAL 2 TIMES DAILY
Status: CANCELLED | OUTPATIENT
Start: 2024-07-13

## 2024-07-13 RX ORDER — SIMETHICONE 80 MG
80 TABLET,CHEWABLE ORAL 4 TIMES DAILY PRN
Status: CANCELLED | OUTPATIENT
Start: 2024-07-13

## 2024-07-13 RX ORDER — DIPHENHYDRAMINE HCL 25 MG
25 CAPSULE ORAL NIGHTLY PRN
Status: CANCELLED | OUTPATIENT
Start: 2024-07-13

## 2024-07-13 RX ORDER — ONDANSETRON 4 MG/1
4 TABLET, ORALLY DISINTEGRATING ORAL EVERY 8 HOURS PRN
Status: CANCELLED | OUTPATIENT
Start: 2024-07-13

## 2024-07-13 RX ORDER — CARBOPROST TROMETHAMINE 250 UG/ML
250 INJECTION, SOLUTION INTRAMUSCULAR AS NEEDED
Status: CANCELLED | OUTPATIENT
Start: 2024-07-13

## 2024-07-13 RX ORDER — CALCIUM CARBONATE 500 MG/1
1 TABLET, CHEWABLE ORAL 3 TIMES DAILY PRN
Status: CANCELLED | OUTPATIENT
Start: 2024-07-13

## 2024-07-13 RX ORDER — IBUPROFEN 600 MG/1
600 TABLET ORAL EVERY 6 HOURS PRN
Status: DISCONTINUED | OUTPATIENT
Start: 2024-07-13 | End: 2024-07-15 | Stop reason: HOSPADM

## 2024-07-13 RX ORDER — PRENATAL VIT/IRON FUM/FOLIC AC 27MG-0.8MG
1 TABLET ORAL DAILY
Status: CANCELLED | OUTPATIENT
Start: 2024-07-13

## 2024-07-13 RX ORDER — MAGNESIUM SULFATE HEPTAHYDRATE 40 MG/ML
2 INJECTION, SOLUTION INTRAVENOUS CONTINUOUS
Status: DISCONTINUED | OUTPATIENT
Start: 2024-07-13 | End: 2024-07-15 | Stop reason: HOSPADM

## 2024-07-13 RX ORDER — PROMETHAZINE HYDROCHLORIDE 12.5 MG/1
12.5 TABLET ORAL EVERY 4 HOURS PRN
Status: CANCELLED | OUTPATIENT
Start: 2024-07-13

## 2024-07-13 RX ORDER — ONDANSETRON 2 MG/ML
4 INJECTION INTRAMUSCULAR; INTRAVENOUS EVERY 6 HOURS PRN
Status: CANCELLED | OUTPATIENT
Start: 2024-07-13

## 2024-07-13 RX ORDER — OXYTOCIN/0.9 % SODIUM CHLORIDE 30/500 ML
125 PLASTIC BAG, INJECTION (ML) INTRAVENOUS CONTINUOUS PRN
Status: CANCELLED | OUTPATIENT
Start: 2024-07-13

## 2024-07-13 RX ORDER — HYDROCORTISONE 25 MG/G
1 CREAM TOPICAL AS NEEDED
Status: CANCELLED | OUTPATIENT
Start: 2024-07-13

## 2024-07-13 RX ORDER — HYDROCODONE BITARTRATE AND ACETAMINOPHEN 5; 325 MG/1; MG/1
1 TABLET ORAL EVERY 4 HOURS PRN
Status: CANCELLED | OUTPATIENT
Start: 2024-07-13 | End: 2024-07-20

## 2024-07-13 RX ORDER — HYDROCODONE BITARTRATE AND ACETAMINOPHEN 10; 325 MG/1; MG/1
1 TABLET ORAL EVERY 4 HOURS PRN
Status: CANCELLED | OUTPATIENT
Start: 2024-07-13 | End: 2024-07-20

## 2024-07-13 RX ADMIN — PENICILLIN G 3 MILLION UNITS: 3000000 INJECTION, SOLUTION INTRAVENOUS at 08:30

## 2024-07-13 RX ADMIN — MAGNESIUM SULFATE HEPTAHYDRATE 2 G/HR: 40 INJECTION, SOLUTION INTRAVENOUS at 12:28

## 2024-07-13 RX ADMIN — SODIUM CHLORIDE, POTASSIUM CHLORIDE, SODIUM LACTATE AND CALCIUM CHLORIDE 100 ML/HR: 600; 310; 30; 20 INJECTION, SOLUTION INTRAVENOUS at 00:59

## 2024-07-13 RX ADMIN — Medication 2 MILLI-UNITS/MIN: at 00:27

## 2024-07-13 RX ADMIN — SODIUM CHLORIDE, POTASSIUM CHLORIDE, SODIUM LACTATE AND CALCIUM CHLORIDE 100 ML/HR: 600; 310; 30; 20 INJECTION, SOLUTION INTRAVENOUS at 06:27

## 2024-07-13 RX ADMIN — MAGNESIUM SULFATE HEPTAHYDRATE 2 G/HR: 40 INJECTION, SOLUTION INTRAVENOUS at 22:59

## 2024-07-13 RX ADMIN — LIDOCAINE HYDROCHLORIDE AND EPINEPHRINE 6 ML: 20; 5 INJECTION, SOLUTION EPIDURAL; INFILTRATION; INTRACAUDAL; PERINEURAL at 03:40

## 2024-07-13 RX ADMIN — ACETAMINOPHEN 650 MG: 325 TABLET ORAL at 19:56

## 2024-07-13 RX ADMIN — MAGNESIUM SULFATE HEPTAHYDRATE 2 G/HR: 40 INJECTION, SOLUTION INTRAVENOUS at 01:24

## 2024-07-13 RX ADMIN — PENICILLIN G 3 MILLION UNITS: 3000000 INJECTION, SOLUTION INTRAVENOUS at 03:50

## 2024-07-13 NOTE — LACTATION NOTE
This note was copied from a baby's chart.     07/13/24 6275   Maternal Information   Date of Referral 07/13/24   Person Making Referral lactation consultant  (courtesy visit, newly postpartum)   Maternal Reason for Referral breastfeeding currently;separation from infant   Infant Reason for Referral NICU admission  (34w)   Maternal Assessment   Breast Size Issue none   Breast Shape Bilateral:;round   Breast Density Bilateral:;soft   Areola Bilateral:;elastic   Nipples Bilateral:;everted   Left Nipple Symptoms intact;nontender   Right Nipple Symptoms intact;nontender   Maternal Infant Feeding   Maternal Emotional State relaxed;receptive   Milk Expression/Equipment   Breast Pump Type double electric, hospital grade;double electric, personal  (has hands free Medela at home)   Breast Pump Flange Type hard   Breast Pump Flange Size 21 mm   Breast Pumping   Breast Pumping Interventions early pumping promoted;frequent pumping encouraged;other (see comments)  (Encouraged to pump Q3H around the clock to build optimal milk supply)   Lactation Referrals   Lactation Referrals outpatient lactation program   Outpatient Lactation Program Lactation Follow-up Date/Time f/u 1-2 wks after baby is discharged     Reviewed NICU information packet with parents including cleaning and sterilizing pump parts, pump log, breastmilk storage guidelines for NICU baby, hand expression. Wash basin, soap, sterilization bag and sterile syringes provided. Sized for 21mm flange. Pt. Was able to pump x 15 min., Denies pain. She did get a few drops out she collected on sterile swab and Dad took NICU for infant. Discussed when full milk supply should come in PP day 3-5 and to call outpatient lactation if not producing 750 ml in a days time by 2 weeks from now. Mom verbalized understanding. Encouraged to call lactation as any questions/concerns arise. Outpatient clinic number given to pt. Encouraged to call for apt. 1-2 weeks after baby is discharged.

## 2024-07-13 NOTE — ANESTHESIA PREPROCEDURE EVALUATION
Anesthesia Evaluation     Patient summary reviewed and Nursing notes reviewed                Airway   Mallampati: II  TM distance: >3 FB  Neck ROM: full  No difficulty expected  Dental      Pulmonary - negative pulmonary ROS   Cardiovascular - negative cardio ROS        Neuro/Psych- negative ROS  GI/Hepatic/Renal/Endo - negative ROS     Musculoskeletal (-) negative ROS    Abdominal    Substance History - negative use     OB/GYN    (+) Pregnant, pregnancy induced hypertension        Other                    Anesthesia Plan    ASA 2     epidural       Anesthetic plan, risks, benefits, and alternatives have been provided, discussed and informed consent has been obtained with: patient.    Use of blood products discussed with patient .      CODE STATUS:    Level Of Support Discussed With: Patient  Code Status (Patient has no pulse and is not breathing): CPR (Attempt to Resuscitate)  Medical Interventions (Patient has pulse or is breathing): Full Support

## 2024-07-13 NOTE — ANESTHESIA PROCEDURE NOTES
Labor Epidural      Patient reassessed immediately prior to procedure    Patient location during procedure: OB  Performed By  Anesthesiologist: Pawel Araujo DO  Preanesthetic Checklist  Completed: patient identified, IV checked, site marked, risks and benefits discussed, surgical consent, monitors and equipment checked, pre-op evaluation and timeout performed  Prep:  Pt Position:sitting  Sterile Tech:gloves, mask, sterile barrier and cap  Prep:chlorhexidine gluconate and isopropyl alcohol  Monitoring:blood pressure monitoring and continuous pulse oximetry  Epidural Block Procedure:  Approach:midline  Guidance:landmark technique and palpation technique  Location:L3-L4  Needle Type:Tuohy  Needle Gauge:17 G  Loss of Resistance Medium: air  Loss of Resistance: 5cm  Cath Depth at skin:10 cm  Paresthesia: none  Aspiration:negative  Test Dose:negative  Number of Attempts: 1  Post Assessment:  Dressing:secured with tape and occlusive dressing applied (Tegaderm Placed)  Pt Tolerance:patient tolerated the procedure well with no apparent complications  Complications:no

## 2024-07-13 NOTE — PROGRESS NOTES
"07/13/24  09:39 EDT  Kristen Gaston Mino    SUBJECTIVE: comfortable with epidural, feeling more pressure     ASSESSMENTS:     /85   Pulse 106   Temp 98.7 °F (37.1 °C) (Oral)   Resp 16   Ht 160 cm (63\")   Wt 68.9 kg (152 lb)   SpO2 100%   Breastfeeding No   BMI 26.93 kg/m²     Fetal Heart Rate Assessment   Method: Fetal HR Assessment Method: external   Beats/min: Fetal HR (beats/min): 120   Baseline: Fetal HR Baseline: normal range   Varibility: Fetal HR Variability: moderate (amplitude range 6 to 25 bpm)   Accels: Fetal HR Accelerations: absent   Decels: Fetal HR Decelerations: absent   Tracing Category:       Uterine Assessment   Method: Method: IUPC (intrauterine pressure catheter)   Frequency (min): Contraction Frequency (Minutes): 4-6   Ctx Count in 10 min: Contractions in 10 Minutes: x2   Duration:     Intensity: Contraction Intensity: moderate by palpation   Intensity by IUPC: Contraction Intensity (mmHg) by IUPC: 60-85   Resting Tone: Uterine Resting Tone: soft by palpation   Resting Tone by IUPC: Uterine Resting Tone (mmHg) by IUPC: 20     Presentation: cephalic   Cervix: Exam by: Method: sterile exam per RN   Dilation: Cervical Dilation (cm): 9   Effacement: Cervical Effacement: 90%   Station: Fetal Station: -1            IUP at 34w1d   PreE with SF          PLAN:  -Feeing more pressure with contractions  -SVE with small anterior cervical lip that retracts with contraction  -Fetal status +1   -Will prepare to start pushing  -IUPC spontaneously removed. FHT demonstrated approximately 5-10 minute period of deep variable decelerations. IUPC replaced without difficulty and 100ml continuous LR infusion started  -Start pushing    Mirtha Batres DO  09:39 EDT  07/13/24      "

## 2024-07-14 LAB
ALP SERPL-CCNC: 117 U/L (ref 39–117)
ALT SERPL W P-5'-P-CCNC: 13 U/L (ref 1–33)
AST SERPL-CCNC: 26 U/L (ref 1–32)
BILIRUB SERPL-MCNC: 0.2 MG/DL (ref 0–1.2)
CREAT SERPL-MCNC: 0.39 MG/DL (ref 0.57–1)
DEPRECATED RDW RBC AUTO: 44.8 FL (ref 37–54)
ERYTHROCYTE [DISTWIDTH] IN BLOOD BY AUTOMATED COUNT: 12.8 % (ref 12.3–15.4)
HCT VFR BLD AUTO: 30.6 % (ref 34–46.6)
HGB BLD-MCNC: 10.2 G/DL (ref 12–15.9)
LDH SERPL-CCNC: 296 U/L (ref 135–214)
MCH RBC QN AUTO: 32 PG (ref 26.6–33)
MCHC RBC AUTO-ENTMCNC: 33.3 G/DL (ref 31.5–35.7)
MCV RBC AUTO: 95.9 FL (ref 79–97)
PLATELET # BLD AUTO: 128 10*3/MM3 (ref 140–450)
PMV BLD AUTO: 10.1 FL (ref 6–12)
RBC # BLD AUTO: 3.19 10*6/MM3 (ref 3.77–5.28)
URATE SERPL-MCNC: 4 MG/DL (ref 2.4–5.7)
WBC NRBC COR # BLD AUTO: 11.49 10*3/MM3 (ref 3.4–10.8)

## 2024-07-14 PROCEDURE — 84550 ASSAY OF BLOOD/URIC ACID: CPT | Performed by: STUDENT IN AN ORGANIZED HEALTH CARE EDUCATION/TRAINING PROGRAM

## 2024-07-14 PROCEDURE — 84450 TRANSFERASE (AST) (SGOT): CPT | Performed by: STUDENT IN AN ORGANIZED HEALTH CARE EDUCATION/TRAINING PROGRAM

## 2024-07-14 PROCEDURE — 82247 BILIRUBIN TOTAL: CPT | Performed by: STUDENT IN AN ORGANIZED HEALTH CARE EDUCATION/TRAINING PROGRAM

## 2024-07-14 PROCEDURE — 82565 ASSAY OF CREATININE: CPT | Performed by: STUDENT IN AN ORGANIZED HEALTH CARE EDUCATION/TRAINING PROGRAM

## 2024-07-14 PROCEDURE — 25010000002 HYDRALAZINE PER 20 MG: Performed by: OBSTETRICS & GYNECOLOGY

## 2024-07-14 PROCEDURE — 83615 LACTATE (LD) (LDH) ENZYME: CPT | Performed by: STUDENT IN AN ORGANIZED HEALTH CARE EDUCATION/TRAINING PROGRAM

## 2024-07-14 PROCEDURE — 84075 ASSAY ALKALINE PHOSPHATASE: CPT | Performed by: STUDENT IN AN ORGANIZED HEALTH CARE EDUCATION/TRAINING PROGRAM

## 2024-07-14 PROCEDURE — 25810000003 LACTATED RINGERS PER 1000 ML: Performed by: STUDENT IN AN ORGANIZED HEALTH CARE EDUCATION/TRAINING PROGRAM

## 2024-07-14 PROCEDURE — 84460 ALANINE AMINO (ALT) (SGPT): CPT | Performed by: STUDENT IN AN ORGANIZED HEALTH CARE EDUCATION/TRAINING PROGRAM

## 2024-07-14 PROCEDURE — 85027 COMPLETE CBC AUTOMATED: CPT | Performed by: STUDENT IN AN ORGANIZED HEALTH CARE EDUCATION/TRAINING PROGRAM

## 2024-07-14 RX ORDER — NIFEDIPINE 30 MG/1
30 TABLET, EXTENDED RELEASE ORAL 2 TIMES DAILY
Status: DISCONTINUED | OUTPATIENT
Start: 2024-07-14 | End: 2024-07-14

## 2024-07-14 RX ORDER — NIFEDIPINE 30 MG/1
30 TABLET, EXTENDED RELEASE ORAL ONCE
Status: COMPLETED | OUTPATIENT
Start: 2024-07-14 | End: 2024-07-14

## 2024-07-14 RX ORDER — NIFEDIPINE 10 MG/1
10 CAPSULE ORAL ONCE
Status: COMPLETED | OUTPATIENT
Start: 2024-07-14 | End: 2024-07-14

## 2024-07-14 RX ORDER — NIFEDIPINE 30 MG/1
60 TABLET, EXTENDED RELEASE ORAL 2 TIMES DAILY
Status: DISCONTINUED | OUTPATIENT
Start: 2024-07-14 | End: 2024-07-15 | Stop reason: HOSPADM

## 2024-07-14 RX ADMIN — HYDRALAZINE HYDROCHLORIDE 5 MG: 20 INJECTION INTRAMUSCULAR; INTRAVENOUS at 13:53

## 2024-07-14 RX ADMIN — NIFEDIPINE 10 MG: 10 CAPSULE ORAL at 15:16

## 2024-07-14 RX ADMIN — HYDRALAZINE HYDROCHLORIDE 5 MG: 20 INJECTION INTRAMUSCULAR; INTRAVENOUS at 14:32

## 2024-07-14 RX ADMIN — NIFEDIPINE 30 MG: 30 TABLET, EXTENDED RELEASE ORAL at 11:57

## 2024-07-14 RX ADMIN — NIFEDIPINE 60 MG: 30 TABLET, EXTENDED RELEASE ORAL at 22:29

## 2024-07-14 RX ADMIN — SODIUM CHLORIDE, POTASSIUM CHLORIDE, SODIUM LACTATE AND CALCIUM CHLORIDE 75 ML/HR: 600; 310; 30; 20 INJECTION, SOLUTION INTRAVENOUS at 01:03

## 2024-07-14 RX ADMIN — NIFEDIPINE 30 MG: 30 TABLET, EXTENDED RELEASE ORAL at 15:16

## 2024-07-14 RX ADMIN — ACETAMINOPHEN 650 MG: 325 TABLET ORAL at 08:28

## 2024-07-14 NOTE — ANESTHESIA POSTPROCEDURE EVALUATION
Patient: Kristen Herzog    Procedure Summary       Date: 07/12/24 Room / Location:     Anesthesia Start: 2041 Anesthesia Stop: 07/13/24 1038    Procedure: LABOR ANALGESIA Diagnosis:     Scheduled Providers:  Provider: Pawel Araujo DO    Anesthesia Type: epidural ASA Status: 2            Anesthesia Type: epidural    Vitals  Vitals Value Taken Time   /87 07/14/24 1546   Temp 98.1 °F (36.7 °C) 07/14/24 1158   Pulse 93 07/14/24 1546   Resp 15 07/14/24 1158   SpO2 99 % 07/14/24 1031           Post Anesthesia Care and Evaluation    Patient location during evaluation: bedside  Patient participation: complete - patient participated  Level of consciousness: awake and awake and alert  Pain score: 0  Pain management: satisfactory to patient    Airway patency: patent  Anesthetic complications: No anesthetic complications  PONV Status: none  Cardiovascular status: acceptable, hemodynamically stable and stable  Respiratory status: acceptable  Hydration status: stable  Post Neuraxial Block status: Motor and sensory function returned to baseline and No signs or symptoms of PDPH

## 2024-07-14 NOTE — PROGRESS NOTES
[facility]  Obstetric Progress Note    Chief Complaint: PPD #1    Subjective     Kristen doing well. Pain is well controlled. Reports light lochia without passage of major clots. Ambulating. Tolerating PO intake. Voiding without difficulty or dysuria. Reports flatus. Denies HA, VC, CP, SOA, RUQ pain. She is tolerating magnesium well.   Male infant in NICU recovering. Reports good mood.   No further complaints at this time.      Objective     Vital Signs Range for the last 24 hours  Temp:  [97.9 °F (36.6 °C)-99.8 °F (37.7 °C)] 98.3 °F (36.8 °C)   BP: (105-171)/() 120/68   Heart Rate:  [] 74   Resp:  [14-20] 18   SpO2:  [96 %-100 %] 96 %       Device (Oxygen Therapy): room air       Intake/Output last 24 hours:      Intake/Output Summary (Last 24 hours) at 7/14/2024 0440  Last data filed at 7/13/2024 2248  Gross per 24 hour   Intake 3733.79 ml   Output 3475 ml   Net 258.79 ml       Intake/Output this shift:    I/O this shift:  In: -   Out: 800 [Urine:800]    Physical Exam:  General: A&Ox3. No acute distress.    HEENT Normocephalic, atraumatic    Heart RRR   Lungs CTAB, unlabored breathing   Abdomen Soft, non tender, negative for guarding and rebound   Extremities Exam of extremities: peripheral pulses normal, no pedal edema, no clubbing or cyanosis   Fundus Firm, midline, below umbilicus       Laboratory Results:  Lab Results   Component Value Date    WBC 13.02 (H) 07/11/2024    HGB 12.6 07/11/2024    HCT 36.1 07/11/2024    MCV 89.6 07/11/2024     07/11/2024    URICACID 4.7 07/11/2024    AST 25 07/11/2024    ALT 13 07/11/2024    HEPBSAG Non-Reactive 01/26/2024         Assessment  PPD# 1 s/p vaginal delivery  Preeclampsia with severe features    Plan  Management on APU  S/p 24 hour IV Mag PP  Blood pressures remained normotensive overnight and have increased to mild range elevations today. Patient denies symptoms. Will add Procardia 30mg BID to regimen. Order to report if increase to 160/110.   Labs  normal this am  PP Hgb 10.2  Following stabilization of Bps on APU plan transition to MBU  Serial lochia, fundal height checks appropriate. Continue serially.   Pain well controled with tylenol and ibuprofen PRN.  Encourage PO hydration, ambulation, IS.  Male infant recovering in NICU      Dispo  Plan for transition to MBU      This note has been electronically signed.    Mirtha Batres DO  04:40 EDT  July 14, 2024

## 2024-07-14 NOTE — PROGRESS NOTES
Contacted per nursing regarding blood pressure elevation. Blood pressures to 160-170/80 range. Patient reports no new symptoms. She was started on Procardia 30mg BID with first dose this am. Orders given for hydralazine 5mg with elevated readings 20min following administration. A second dose of 5mg hydralazine was given with persistent elevation. Order given for 10mg PO Procardia and additional 30mg ER procardia administration at this time with increase in overall procardia dose to 60mg BID. Will recheck blood pressure per protocol.     -->Following monitoring period blood pressure returned to normotensive range. Will continue to monitor on APU overnight with transition to MBU in am if remains stable    Mirtha Batres DO  15:12 EDT  07/14/24

## 2024-07-15 VITALS
HEIGHT: 63 IN | BODY MASS INDEX: 26.93 KG/M2 | WEIGHT: 152 LBS | TEMPERATURE: 98.2 F | OXYGEN SATURATION: 98 % | SYSTOLIC BLOOD PRESSURE: 146 MMHG | DIASTOLIC BLOOD PRESSURE: 73 MMHG | HEART RATE: 85 BPM | RESPIRATION RATE: 18 BRPM

## 2024-07-15 PROBLEM — O16.9 HYPERTENSION IN PREGNANCY, ANTEPARTUM: Status: RESOLVED | Noted: 2024-07-11 | Resolved: 2024-07-15

## 2024-07-15 PROBLEM — O14.13 SEVERE PRE-ECLAMPSIA, THIRD TRIMESTER: Status: ACTIVE | Noted: 2024-07-15

## 2024-07-15 RX ORDER — DOCUSATE SODIUM 100 MG/1
100 CAPSULE, LIQUID FILLED ORAL 2 TIMES DAILY PRN
Qty: 60 CAPSULE | Refills: 1 | Status: SHIPPED | OUTPATIENT
Start: 2024-07-15

## 2024-07-15 RX ORDER — IBUPROFEN 600 MG/1
600 TABLET ORAL EVERY 6 HOURS PRN
Qty: 60 TABLET | Refills: 0 | Status: SHIPPED | OUTPATIENT
Start: 2024-07-15

## 2024-07-15 RX ADMIN — NIFEDIPINE 60 MG: 30 TABLET, EXTENDED RELEASE ORAL at 09:23

## 2024-07-15 NOTE — PAYOR COMM NOTE
"Kaitlynn Herzog (29 y.o. Female) [id number]      Date of Birth   1995    Social Security Number       Address   [address] [id number]    Home Phone   [phone]    MRN   [id number]       Yazidism   None    Marital Status   Single                            Admission Date   7/11/24    Admission Type   Elective    Admitting Provider   Nelly Wesley MD    Attending Provider       Department, Room/Bed   [facility] ANTEPARTUM, N325/1       Discharge Date   7/15/2024    Discharge Disposition   Home or Self Care    Discharge Destination                                 Attending Provider: (none)   Allergies: No Known Allergies    Isolation: None   Infection: None   Code Status: Prior    Ht: 160 cm (63\")   Wt: 68.9 kg (152 lb)    Admission Cmt: None   Principal Problem: Hypertension in pregnancy, antepartum [O16.9]                   Active Insurance as of 7/11/2024       Primary Coverage       Payor Plan Insurance Group Employer/Plan Group    [employer] EMPLOYEE [id number]       Payor Plan Address Payor Plan Phone Number Payor Plan Fax Number Effective Dates    [address] [phone]  10/1/2023 - None Entered    [place] [address]         Subscriber Name Subscriber Birth Date Member ID       KAITLYNN HERZOG 1995 [id number]                     Emergency Contacts        (Rel.) Home Phone Work Phone Mobile Phone    pietro gutierrez (Mother) -- -- [phone]    Rhett Herzog (Spouse) -- -- [phone]              Insurance Information                  [employer] [facility] Phone: [phone]    Subscriber: Kaitlynn Herzog Subscriber#: [id number]    Group#: [id number] Precert#: --             Discharge Summary        Janet Farley MD at 07/15/24 0829          [facility]  Vaginal Delivery Discharge Summary      Patient: Kaitlynn Herzog      MR#:[id number]  Admission  Diagnosis: IUP @ " 33w6d, pre-eclampsia with severe features  Discharge Diagnosis: PPD#2 s/p  @ 34w1d    Date of Admission: 2024  Date of Discharge:  7/15/2024    Procedures:  Vaginal, Spontaneous     2024    10:33 AM      Service:  Obstetrics    Hospital Course:  Patient was admitted for severe range BPs. She was given BMZ for FLM and started on magnesium sulfate for seizure ppx. She underwent IOL and had a vaginal delivery and remained in the hospital for 4 days.  During that time she remained afebrile and hemodynamically stable.  On the day of discharge, she was eating, ambulating and voiding without difficulty.  She received magnesium sulfate throughout IOL and for 24h PP. BPs were eventually controlled on nifedipine XL 60mg BID.    Lab Results   Component Value Date    WBC 11.49 (H) 2024    HGB 10.2 (L) 2024    HCT 30.6 (L) 2024    MCV 95.9 2024     (L) 2024    CREATININE 0.39 (L) 2024    URICACID 4.0 2024    AST 26 2024    ALT 13 2024     (H) 2024     Results from last 7 days   Lab Units 24  182   ABO TYPING  A   RH TYPING  Positive   ANTIBODY SCREEN  Negative       Discharge Medications     Discharge Medications        New Medications        Instructions Start Date   docusate sodium 100 MG capsule  Commonly known as: Colace   100 mg, Oral, 2 Times Daily PRN      ibuprofen 600 MG tablet  Commonly known as: ADVIL,MOTRIN   600 mg, Oral, Every 6 Hours PRN      NIFEdipine CC 60 MG 24 hr tablet  Commonly known as: ADALAT CC   60 mg, Oral, 2 Times Daily             Continue These Medications        Instructions Start Date   ferrous sulfate 324 (65 Fe) MG tablet delayed-release EC tablet   324 mg, Oral, Daily With Breakfast      Prenatal 27-1 27-1 MG tablet tablet   Oral, Daily               Discharge Disposition:  To Home    Discharge Condition:  Stable    Discharge Diet: Regular    Activity at Discharge: Pelvic rest    Follow-up  Appointments  1 week - BP check      Janet Farley MD  07/15/24  08:29 EDT        Electronically signed by Janet Farley MD at 07/15/24 0831

## 2024-07-15 NOTE — PROGRESS NOTES
7/15/2024  PPD #2    Subjective   Kristen feels well.  Patient describes her lochia as less than menses.  Pain is well controlled       Objective   Temp: Temp:  [98.1 °F (36.7 °C)-99.3 °F (37.4 °C)] 98.2 °F (36.8 °C) Temp src: Axillary   BP: BP: (126-175)/(69-97) 134/88        Pulse: Heart Rate:  [] 94  RR: Resp:  [15-18] 18    General:  No acute distress   Abdomen: Fundus firm and beneath umbilicus   Pelvis: deferred     Lab Results   Component Value Date    WBC 11.49 (H) 07/14/2024    HGB 10.2 (L) 07/14/2024    HCT 30.6 (L) 07/14/2024    MCV 95.9 07/14/2024     (L) 07/14/2024    HEPBSAG Non-Reactive 01/26/2024    AST 26 07/14/2024    ALT 13 07/14/2024    URICACID 4.0 07/14/2024       Assessment  PPD# 2 after vaginal delivery  Pre-eclampsia with severe features    Plan  Discharge to home  S/p magnesium sulfate; BPs now normal to mild range on procardia XL 60mg BID  Follow up with LW  in 1 week for BP check  Advised no tampons, intercourse, or tub baths for 6 weeks.       This note has been electronically signed.    Janet Farley MD  08:25 EDT  July 15, 2024

## 2024-07-15 NOTE — PAYOR COMM NOTE
"MinoKaitlynn (29 y.o. Female) [id number]      Date of Birth   1995    Social Security Number       Address   [address] [facility] [address] [facility] [address]    Home Phone   [phone]    MRN   [id number]       Scientology   None    Marital Status   Single                            Admission Date   7/11/24    Admission Type   Elective    Admitting Provider   Nelly Wesley MD    Attending Provider   Nelly Wesley MD    Department, Room/Bed   [facility] ANTEPARTUM, N325/1       Discharge Date       Discharge Disposition   Home or Self Care    Discharge Destination                                 Attending Provider: Nelly Wesley MD    Allergies: No Known Allergies    Isolation: None   Infection: None   Code Status: CPR    Ht: 160 cm (63\")   Wt: 68.9 kg (152 lb)    Admission Cmt: None   Principal Problem: Hypertension in pregnancy, antepartum [O16.9]                   Active Insurance as of 7/11/2024       Primary Coverage       Payor Plan Insurance Group Employer/Plan Group    [facility] [employer] [facility] EMPL[facility] [id number]       Payor Plan Address Payor Plan Phone Number Payor Plan Fax Number Effective Dates    [address] [phone]  10/1/2023 - None Entered    [facility] [id number]         Subscriber Name Subscriber Birth Date Member ID       KAITLYNN HERZOG FENG 1995 [id number]                     Emergency Contacts        (Rel.) Home Phone Work Phone Mobile Phone    pietro gutierrez (Mother) -- -- [phone]    Rhett Herzog (Spouse) -- -- [phone]              Insurance Information                  [facility] Phone: [phone]    Subscriber: Mino Kaitlynnvicki Gaston Subscriber#: [id number]    Group#: [id number] Precert#: --             History & Physical        Nelly Wesley MD at 07/11/24 [address] [place] [address]  Obstetric History and Physical    Chief Complaint   Patient presents with    Elevated Blood " Pressure       Subjective    Patient is a 29 y.o. female  currently at 33w6d, who presented to the office for routine prenatal visit.  Her blood pressure was noted to be severe range.  She reported a headache 3 days ago that resolved with Tylenol.  Her ears have felt very hot for the last 3 days.    Her prenatal care had previously been uncomplicated to date.  Her previous obstetric/gynecological history is non-contributory.    The following portions of the patients history were reviewed and updated as appropriate: current medications, allergies, past medical history, past surgical history, past family history, past social history, and problem list .                   Past OB History:       OB History    Para Term  AB Living   1 0 0 0 0 0   SAB IAB Ectopic Molar Multiple Live Births   0 0 0 0 0 0      # Outcome Date GA Lbr Juan/2nd Weight Sex Type Anes PTL Lv   1 Current                Past Medical History: No past medical history on file.   Past Surgical History Past Surgical History:   Procedure Laterality Date    WISDOM TOOTH EXTRACTION        Family History: No family history on file.   Social History:  reports that she has never smoked. She has never used smokeless tobacco.   reports no history of alcohol use.   reports no history of drug use.        REVIEW OF SYSTEMS              Reports fetal movement is normal             Denies leakage of amniotic fluid.             Denies vaginal bleeding             She reports No contractions             All other systems reviewed and are negative    Objective      Vital Signs Range for the last 24 hours  Temperature:     Temp Source:     BP: BP: (162-166)/(105-106) 166/106   Pulse:     Respirations:     SPO2:     O2 Amount (l/min):     O2 Devices     Weight: Weight:  [68.9 kg (152 lb)] 68.9 kg (152 lb)          Fetal Heart Rate Assessment   Method:     Beats/min:     Baseline:  135   Variability:  moderate   Accels:  15x15   Decels:  absent   Tracing  Category:  1     Uterine Assessment   Method: Method: palpation, per patient report   Frequency (min):  q4min   Ctx Count in 10 min:     Duration:     Intensity: Contraction Intensity: no contractions   Intensity by IUPC:     Resting Tone: Uterine Resting Tone: soft by palpation   Resting Tone by IUPC:     Fernando Units:       Constitutional:  Well developed, well nourished, no acute distress, well-groomed.   Uterus: Soft, nontender. Fundus appropriate for dates.  Neurologic:  Alert & oriented x 3,  no focal deficits noted.   Psychiatric:  Speech and behavior appropriate.   Extremities: no cyanosis, clubbing or edema, no evidence of DVT.        Labs:  Lab Results   Component Value Date    WBC 9.35 05/21/2024    HGB 10.7 (L) 05/21/2024    HCT 32.0 (L) 05/21/2024    MCV 95.0 05/21/2024     05/21/2024    CREATININE 0.73 04/09/2021    AST 23 04/09/2021    ALT 17 04/09/2021               Assessment & Plan      Hypertension in pregnancy, antepartum        Assessment:  1.  Intrauterine pregnancy at 33w6d weeks gestation with reactive fetal status.    2.   Severe hypertension  3.  Obstetrical history non-contributory  4.  GBS status: unknown    Plan:  CBC, PEP, urine P:C.  Nifedipine IR now for severe range blood pressure.  Betamethasone for fetal lung maturity.  Repeat in 12 hours.  Magnesium sulfate for seizure prophylaxis.  2. Plan of care has been reviewed with patient and questions answered.  3.  Risks, benefits of treatment plan have been discussed.  4.  All questions have been answered.        Nelly Wesley MD  7/11/2024  18:20 EDT     Electronically signed by Nelly Wesley MD at 07/11/24 1826       H&P signed by New Onbase, [place] at 07/12/24 0233         [Media Unavailable] Scan on 7/12/2024 0232 by New On[employer], [facility]: Prenatal Record          Electronically signed by New Onbase, [facility] at 07/12/24 0233          Physician Progress Notes (last 5 days)        Janet Farley MD at 07/15/24 3583           7/15/2024  PPD #2    Subjective   Kristen feels well.  Patient describes her lochia as less than menses.  Pain is well controlled       Objective   Temp: Temp:  [98.1 °F (36.7 °C)-99.3 °F (37.4 °C)] 98.2 °F (36.8 °C) Temp src: Axillary   BP: BP: (126-175)/(69-97) 134/88        Pulse: Heart Rate:  [] 94  RR: Resp:  [15-18] 18    General:  No acute distress   Abdomen: Fundus firm and beneath umbilicus   Pelvis: deferred     Lab Results   Component Value Date    WBC 11.49 (H) 07/14/2024    HGB 10.2 (L) 07/14/2024    HCT 30.6 (L) 07/14/2024    MCV 95.9 07/14/2024     (L) 07/14/2024    HEPBSAG Non-Reactive 01/26/2024    AST 26 07/14/2024    ALT 13 07/14/2024    URICACID 4.0 07/14/2024       Assessment  PPD# 2 after vaginal delivery  Pre-eclampsia with severe features    Plan  Discharge to home  S/p magnesium sulfate; BPs now normal to mild range on procardia XL 60mg BID  Follow up with LW  in 1 week for BP check  Advised no tampons, intercourse, or tub baths for 6 weeks.       This note has been electronically signed.    Janet Farley MD  08:25 EDT  July 15, 2024      Electronically signed by Janet Farley MD at 07/15/24 0826       Mirtha Batres DO at 07/14/24 1509          Contacted per nursing regarding blood pressure elevation. Blood pressures to 160-170/80 range. Patient reports no new symptoms. She was started on Procardia 30mg BID with first dose this am. Orders given for hydralazine 5mg with elevated readings 20min following administration. A second dose of 5mg hydralazine was given with persistent elevation. Order given for 10mg PO Procardia and additional 30mg ER procardia administration at this time with increase in overall procardia dose to 60mg BID. Will recheck blood pressure per protocol.     -->Following monitoring period blood pressure returned to normotensive range. Will continue to monitor on APU overnight with transition to MBU in am if remains stable    Mirtha Batres  DO  15:12 EDT  07/14/24      Electronically signed by Mirtha Batres DO at 07/14/24 2328       Mirtha Batres DO at 07/14/24 0440          [facility]  Obstetric Progress Note    Chief Complaint: PPD #1    Subjective     Kristen doing well. Pain is well controlled. Reports light lochia without passage of major clots. Ambulating. Tolerating PO intake. Voiding without difficulty or dysuria. Reports flatus. Denies HA, VC, CP, SOA, RUQ pain. She is tolerating magnesium well.   Male infant in NICU recovering. Reports good mood.   No further complaints at this time.      Objective     Vital Signs Range for the last 24 hours  Temp:  [97.9 °F (36.6 °C)-99.8 °F (37.7 °C)] 98.3 °F (36.8 °C)   BP: (105-171)/() 120/68   Heart Rate:  [] 74   Resp:  [14-20] 18   SpO2:  [96 %-100 %] 96 %       Device (Oxygen Therapy): room air       Intake/Output last 24 hours:      Intake/Output Summary (Last 24 hours) at 7/14/2024 0440  Last data filed at 7/13/2024 2248  Gross per 24 hour   Intake 3733.79 ml   Output 3475 ml   Net 258.79 ml       Intake/Output this shift:    I/O this shift:  In: -   Out: 800 [Urine:800]    Physical Exam:  General: A&Ox3. No acute distress.    HEENT Normocephalic, atraumatic    Heart RRR   Lungs CTAB, unlabored breathing   Abdomen Soft, non tender, negative for guarding and rebound   Extremities Exam of extremities: peripheral pulses normal, no pedal edema, no clubbing or cyanosis   Fundus Firm, midline, below umbilicus       Laboratory Results:  Lab Results   Component Value Date    WBC 13.02 (H) 07/11/2024    HGB 12.6 07/11/2024    HCT 36.1 07/11/2024    MCV 89.6 07/11/2024     07/11/2024    URICACID 4.7 07/11/2024    AST 25 07/11/2024    ALT 13 07/11/2024    HEPBSAG Non-Reactive 01/26/2024         Assessment  PPD# 1 s/p vaginal delivery  Preeclampsia with severe features    Plan  Management on APU  S/p 24 hour IV Mag PP  Blood pressures remained normotensive overnight and have  "increased to mild range elevations today. Patient denies symptoms. Will add Procardia 30mg BID to regimen. Order to report if increase to 160/110.   Labs normal this am  PP Hgb 10.2  Following stabilization of Bps on APU plan transition to MBU  Serial lochia, fundal height checks appropriate. Continue serially.   Pain well controled with tylenol and ibuprofen PRN.  Encourage PO hydration, ambulation, IS.  Male infant recovering in NICU      Dispo  Plan for transition to MBU      This note has been electronically signed.    Mirtha Batres DO  04:40 EDT  July 14, 2024          Electronically signed by Mirtha Batres DO at 07/14/24 1146       Mirtha Batres DO at 07/13/24 0939          07/13/24  09:39 EDT  Kristen Herzog    SUBJECTIVE: comfortable with epidural, feeling more pressure     ASSESSMENTS:     /85   Pulse 106   Temp 98.7 °F (37.1 °C) (Oral)   Resp 16   Ht 160 cm (63\")   Wt 68.9 kg (152 lb)   SpO2 100%   Breastfeeding No   BMI 26.93 kg/m²     Fetal Heart Rate Assessment   Method: Fetal HR Assessment Method: external   Beats/min: Fetal HR (beats/min): 120   Baseline: Fetal HR Baseline: normal range   Varibility: Fetal HR Variability: moderate (amplitude range 6 to 25 bpm)   Accels: Fetal HR Accelerations: absent   Decels: Fetal HR Decelerations: absent   Tracing Category:       Uterine Assessment   Method: Method: IUPC (intrauterine pressure catheter)   Frequency (min): Contraction Frequency (Minutes): 4-6   Ctx Count in 10 min: Contractions in 10 Minutes: x2   Duration:     Intensity: Contraction Intensity: moderate by palpation   Intensity by IUPC: Contraction Intensity (mmHg) by IUPC: 60-85   Resting Tone: Uterine Resting Tone: soft by palpation   Resting Tone by IUPC: Uterine Resting Tone (mmHg) by IUPC: 20     Presentation: cephalic   Cervix: Exam by: Method: sterile exam per RN   Dilation: Cervical Dilation (cm): 9   Effacement: Cervical Effacement: 90%   Station: Fetal " "Station: -1            IUP at 34w1d   PreE with SF          PLAN:  -Feeing more pressure with contractions  -SVE with small anterior cervical lip that retracts with contraction  -Fetal status +1   -Will prepare to start pushing  -IUPC spontaneously removed. FHT demonstrated approximately 5-10 minute period of deep variable decelerations. IUPC replaced without difficulty and 100ml continuous LR infusion started  -Start pushing    Mirtha Batres DO  09:39 EDT  07/13/24        Electronically signed by Mirtha Batres DO at 07/13/24 0941       Mirtha Batres DO at 07/12/24 1933          07/12/24  19:33 EDT  Kristenvicki Herzog    SUBJECTIVE: comfortable. Denies HA, VC, CP, SOA, RUQ pain.     ASSESSMENTS:     /80 (BP Location: Right arm, Patient Position: Lying)   Pulse 106   Temp 97.9 °F (36.6 °C) (Oral)   Resp 16   Ht 160 cm (63\")   Wt 68.9 kg (152 lb)   SpO2 100%   Breastfeeding No   BMI 26.93 kg/m²     Fetal Heart Rate Assessment   Method: Fetal HR Assessment Method: external   Beats/min: Fetal HR (beats/min): 115   Baseline: Fetal HR Baseline: normal range   Varibility: Fetal HR Variability: moderate (amplitude range 6 to 25 bpm)   Accels: Fetal HR Accelerations: greater than/equal to 15 bpm, lasting at least 15 seconds   Decels: Fetal HR Decelerations: absent   Tracing Category:       Uterine Assessment   Method: Method: IUPC (intrauterine pressure catheter)   Frequency (min): Contraction Frequency (Minutes): 4   Ctx Count in 10 min: Contractions in 10 Minutes: x2   Duration:     Intensity: Contraction Intensity: moderate by palpation   Intensity by IUPC: Contraction Intensity (mmHg) by IUPC: 100   Resting Tone: Uterine Resting Tone: soft by palpation   Resting Tone by IUPC: Uterine Resting Tone (mmHg) by IUPC: 20     Presentation: cephalic   Cervix: Exam by: Method: sterile exam per physician   Dilation: Cervical Dilation (cm): 6   Effacement: Cervical Effacement: 80-90%   Station: Fetal " Station: -1            IUP at 34w0d   PreE with SF      PLAN:  -SVE 6/85/-1  -Difficulty monitoring maternal contractions and minimal discomfort. Discussed placement of IUPC for more precise evaluation of uterine contractions. Reviewed R/B/I/A. Patient consented. IUPC plcaed without complications. Fetal and maternal tolerance of procedure  -Orders given for -250mmHg if fetal tolerance allows  -Continue IV Mag for seizure ppx  -IV PCN for infection ppx  -Continue progress toward vaginal delivery    Mirtha Batres DO  19:33 EDT  07/12/24        Electronically signed by Mirtha Batres DO at 07/12/24 1936       Mirtha Batres DO at 07/12/24 1153          [facility]     Labor Progress Note    CC: Labor    IUP 34w0d, comfortable    Vitals:    07/12/24 1139   BP: 142/71   Pulse: 91   Resp: 16   Temp:    SpO2: 100%       Fetal Heart Rate Assessment           Baseline: Fetal HR Baseline: normal range   Varibility: Fetal HR Variability: moderate (amplitude range 6 to 25 bpm)   Accels: Fetal HR Accelerations: greater than/equal to 15 bpm, lasting at least 15 seconds   Decels: Fetal HR Decelerations: absent   Tracing Category:       Uterine Assessment   Method: Method: external tocotransducer   Frequency (min): Contraction Frequency (Minutes): x1 traced   Ctx Count in 10 min: Contractions in 10 Minutes: x2   Intensity by IUPC:     Resting Tone by IUPC:       Cervix: Exam by: Method: sterile exam per physician   Dilation: Cervical Dilation (cm): 4   Effacement: Cervical Effacement: 60%   Station: Fetal Station: -2            Assessment: IUP 34w0d     Plan:   -s/p isabel bulb removal  -SVE 4/60/-2  -Discussed AROM including R/B/I/A  -Patient consented  -AROM performed with return of clear fluid  -Fetal and maternal tolerance of procedure  -Continue IV PPP  -BP well controlled    Mirtha Batres DO  11:53 EDT  07/12/24    Electronically signed by Mirtha Batres DO at 07/12/24 4057

## 2024-07-15 NOTE — NURSING NOTE
Postpartum discharge instructions reviewed with patient, including post-op vaginal delivery care instructions and s/s of postpartum hemorrhage and pre-eclampsia. Patient instructed to call labor queen or OBGYN office with concerns and return to hospital for any emergencies. To schedule f/u appointments with provider for one week blood pressure check. Patient verbalized understanding. Patient's significant other moved all belongings to private vehicle and vocalized that he will  patient's blood pressure medication from outpatient pharmacy. Patient and significant other ambulated to NICU post discharge.

## 2024-07-15 NOTE — DISCHARGE SUMMARY
[facility]  Vaginal Delivery Discharge Summary      Patient: Kristen Herzog      MR#:[id number]  Admission  Diagnosis: IUP @ 33w6d, pre-eclampsia with severe features  Discharge Diagnosis: PPD#2 s/p  @ 34w1d    Date of Admission: 2024  Date of Discharge:  7/15/2024    Procedures:  Vaginal, Spontaneous     2024    10:33 AM      Service:  Obstetrics    Hospital Course:  Patient was admitted for severe range BPs. She was given BMZ for FLM and started on magnesium sulfate for seizure ppx. She underwent IOL and had a vaginal delivery and remained in the hospital for 4 days.  During that time she remained afebrile and hemodynamically stable.  On the day of discharge, she was eating, ambulating and voiding without difficulty.  She received magnesium sulfate throughout IOL and for 24h PP. BPs were eventually controlled on nifedipine XL 60mg BID.    Lab Results   Component Value Date    WBC 11.49 (H) 2024    HGB 10.2 (L) 2024    HCT 30.6 (L) 2024    MCV 95.9 2024     (L) 2024    CREATININE 0.39 (L) 2024    URICACID 4.0 2024    AST 26 2024    ALT 13 2024     (H) 2024     Results from last 7 days   Lab Units 24  1822   ABO TYPING  A   RH TYPING  Positive   ANTIBODY SCREEN  Negative       Discharge Medications     Discharge Medications        New Medications        Instructions Start Date   docusate sodium 100 MG capsule  Commonly known as: Colace   100 mg, Oral, 2 Times Daily PRN      ibuprofen 600 MG tablet  Commonly known as: ADVIL,MOTRIN   600 mg, Oral, Every 6 Hours PRN      NIFEdipine CC 60 MG 24 hr tablet  Commonly known as: ADALAT CC   60 mg, Oral, 2 Times Daily             Continue These Medications        Instructions Start Date   ferrous sulfate 324 (65 Fe) MG tablet delayed-release EC tablet   324 mg, Oral, Daily With Breakfast      Prenatal 27-1 27-1 MG tablet tablet   Oral, Daily               Discharge Disposition:   To Home    Discharge Condition:  Stable    Discharge Diet: Regular    Activity at Discharge: Pelvic rest    Follow-up Appointments  1 week - BP check      Janet Farley MD  07/15/24  08:29 EDT

## 2024-07-16 NOTE — PAYOR COMM NOTE
"Kaitlynn Herzog (29 y.o. Female)     Auth#[id number]     Discharged 7/15/24.    From:Gerda Bruno LPN, Utilization Review  Phone #[phone]  Fax #[phone]        Date of Birth   1995    Social Security Number       Address   [address] [place], [place] [address]    Home Phone   [phone]    MRN   [id number]       [place]   None    Marital Status   Single                            Admission Date   24    Admission Type   Elective    Admitting Provider   Nelly Wesley MD    Attending Provider       Department, Room/Bed   [facility] ANTEPARTUM, N325/       Discharge Date   7/15/2024    Discharge Disposition   Home or Self Care    Discharge Destination                                 Attending Provider: (none)   Allergies: No Known Allergies    Isolation: None   Infection: None   Code Status: Prior    Ht: 160 cm (63\")   Wt: 68.9 kg (152 lb)    Admission Cmt: None   Principal Problem: Hypertension in pregnancy, antepartum [O16.9]                   Active Insurance as of 2024       Primary Coverage       Payor Plan Insurance Group Employer/Plan Group    [facility] BLUE CR[employer] EMPLOYEE [id number]       Payor Plan Address Payor Plan Phone Number Payor Plan Fax Number Effective Dates    [address] [phone]  10/1/2023 - None Entered    [place] [address]         Subscriber Name Subscriber Birth Date Member ID       KAITLYNN HERZOG 1995 [id number]                     Emergency Contacts        (Rel.) Home Phone Work Phone Mobile Phone    pietro gutierrez (Mother) -- -- [phone]    Rhett Herzog (Spouse) -- -- [phone]                 Discharge Summary        Janet Farley MD at 07/15/24 0829          [facility]  Vaginal Delivery Discharge Summary      Patient: Kaitlynn Herzog      MR#:[id number]  Admission  Diagnosis: IUP @ 33w6d, pre-eclampsia with severe features  Discharge Diagnosis: PPD#2 s/p  @ " 34w1d    Date of Admission: 7/11/2024  Date of Discharge:  7/15/2024    Procedures:  Vaginal, Spontaneous     7/13/2024    10:33 AM      Service:  Obstetrics    Hospital Course:  Patient was admitted for severe range BPs. She was given BMZ for FLM and started on magnesium sulfate for seizure ppx. She underwent IOL and had a vaginal delivery and remained in the hospital for 4 days.  During that time she remained afebrile and hemodynamically stable.  On the day of discharge, she was eating, ambulating and voiding without difficulty.  She received magnesium sulfate throughout IOL and for 24h PP. BPs were eventually controlled on nifedipine XL 60mg BID.    Lab Results   Component Value Date    WBC 11.49 (H) 07/14/2024    HGB 10.2 (L) 07/14/2024    HCT 30.6 (L) 07/14/2024    MCV 95.9 07/14/2024     (L) 07/14/2024    CREATININE 0.39 (L) 07/14/2024    URICACID 4.0 07/14/2024    AST 26 07/14/2024    ALT 13 07/14/2024     (H) 07/14/2024     Results from last 7 days   Lab Units 07/11/24  1822   ABO TYPING  A   RH TYPING  Positive   ANTIBODY SCREEN  Negative       Discharge Medications     Discharge Medications        New Medications        Instructions Start Date   docusate sodium 100 MG capsule  Commonly known as: Colace   100 mg, Oral, 2 Times Daily PRN      ibuprofen 600 MG tablet  Commonly known as: ADVIL,MOTRIN   600 mg, Oral, Every 6 Hours PRN      NIFEdipine CC 60 MG 24 hr tablet  Commonly known as: ADALAT CC   60 mg, Oral, 2 Times Daily             Continue These Medications        Instructions Start Date   ferrous sulfate 324 (65 Fe) MG tablet delayed-release EC tablet   324 mg, Oral, Daily With Breakfast      Prenatal 27-1 27-1 MG tablet tablet   Oral, Daily               Discharge Disposition:  To Home    Discharge Condition:  Stable    Discharge Diet: Regular    Activity at Discharge: Pelvic rest    Follow-up Appointments  1 week - BP check      Janet Farley MD  07/15/24  08:29  EDT        Electronically signed by Janet Farley MD at 07/15/24 0842

## 2024-07-18 LAB
CYTO UR: NORMAL
LAB AP CASE REPORT: NORMAL
LAB AP CLINICAL INFORMATION: NORMAL
PATH REPORT.FINAL DX SPEC: NORMAL
PATH REPORT.GROSS SPEC: NORMAL

## 2024-07-24 ENCOUNTER — MATERNAL SCREENING (OUTPATIENT)
Dept: CALL CENTER | Facility: HOSPITAL | Age: 29
End: 2024-07-24
Payer: COMMERCIAL

## 2024-07-24 NOTE — OUTREACH NOTE
Maternal Screening Survey      Flowsheet Row Responses   Facility patient discharged from? [facility]   Attempt successful? Yes   Call start time    Call end time    EPD Scale: Able to Laugh 0-->as much as she always could   EPD Scale: Looked Forward 0-->as much as she ever did   EPD Scale: Blamed Self 0-->no, never   EPD Scale: Been Anxious 0-->no, not at all   EPD Scale: Felt Panicky 0-->no, not at all   EPD Scale: Things Getting on Top 0-->no, has been coping as well as ever   EPD Scale: Difficulty Sleeping 0-->no, not at all   EPD Scale: Sad or Miserable 0-->no, not at all   EPD Scale: Crying 0-->no, never   EPD Scale: Thought of Harming Self 0-->never   [place]  Depression Score 0   Did any of your parents have problems with alcohol or drug use? No   Do any of your peers have problems with alcohol or drug use? No   Does your partner have problems with alcohol or drug use? No   Before you were pregnant did you have problems with alcohol or drug use? (past) No   In the past month, did you drink beer, wine, liquor or use any other drugs? (pregnancy) No   Maternal Screening call completed Yes   Call end time 1413              ISHA RUSS - Registered Nurse
